# Patient Record
Sex: FEMALE | Race: WHITE | NOT HISPANIC OR LATINO | Employment: FULL TIME | URBAN - METROPOLITAN AREA
[De-identification: names, ages, dates, MRNs, and addresses within clinical notes are randomized per-mention and may not be internally consistent; named-entity substitution may affect disease eponyms.]

---

## 2023-06-01 ENCOUNTER — OFFICE VISIT (OUTPATIENT)
Dept: PRIMARY CARE | Facility: CLINIC | Age: 33
End: 2023-06-01
Payer: COMMERCIAL

## 2023-06-01 VITALS
SYSTOLIC BLOOD PRESSURE: 114 MMHG | HEIGHT: 62 IN | WEIGHT: 111 LBS | HEART RATE: 75 BPM | OXYGEN SATURATION: 97 % | BODY MASS INDEX: 20.43 KG/M2 | DIASTOLIC BLOOD PRESSURE: 80 MMHG

## 2023-06-01 DIAGNOSIS — F98.8 ATTENTION DEFICIT DISORDER (ADD) WITHOUT HYPERACTIVITY: Primary | ICD-10-CM

## 2023-06-01 PROBLEM — R87.610 ASCUS WITH POSITIVE HIGH RISK HPV CERVICAL: Status: ACTIVE | Noted: 2023-06-01

## 2023-06-01 PROBLEM — F40.243 FEAR OF FLYING: Status: ACTIVE | Noted: 2023-06-01

## 2023-06-01 PROBLEM — J01.00 ACUTE MAXILLARY SINUSITIS: Status: ACTIVE | Noted: 2023-06-01

## 2023-06-01 PROBLEM — R87.810 ASCUS WITH POSITIVE HIGH RISK HPV CERVICAL: Status: ACTIVE | Noted: 2023-06-01

## 2023-06-01 PROBLEM — T75.3XXA MOTION SICKNESS: Status: ACTIVE | Noted: 2023-06-01

## 2023-06-01 PROBLEM — N87.0 CIN I (CERVICAL INTRAEPITHELIAL NEOPLASIA I): Status: ACTIVE | Noted: 2023-06-01

## 2023-06-01 PROBLEM — M25.561 KNEE PAIN, RIGHT: Status: ACTIVE | Noted: 2023-06-01

## 2023-06-01 PROBLEM — F32.0 DEPRESSION, MAJOR, SINGLE EPISODE, MILD (CMS-HCC): Status: ACTIVE | Noted: 2023-06-01

## 2023-06-01 PROBLEM — M54.2 CERVICAL PAIN: Status: ACTIVE | Noted: 2023-06-01

## 2023-06-01 PROBLEM — N87.1 CIN II (CERVICAL INTRAEPITHELIAL NEOPLASIA II): Status: ACTIVE | Noted: 2023-06-01

## 2023-06-01 PROBLEM — F41.9 ANXIETY: Status: ACTIVE | Noted: 2023-06-01

## 2023-06-01 PROBLEM — L70.9 ACNE: Status: ACTIVE | Noted: 2023-06-01

## 2023-06-01 PROBLEM — D06.9 CIN III (CERVICAL INTRAEPITHELIAL NEOPLASIA III): Status: ACTIVE | Noted: 2023-06-01

## 2023-06-01 PROCEDURE — 99213 OFFICE O/P EST LOW 20 MIN: CPT | Performed by: FAMILY MEDICINE

## 2023-06-01 PROCEDURE — 1036F TOBACCO NON-USER: CPT | Performed by: FAMILY MEDICINE

## 2023-06-01 RX ORDER — BUPROPION HYDROCHLORIDE 300 MG/1
300 TABLET ORAL DAILY
COMMUNITY
End: 2023-09-28

## 2023-06-01 RX ORDER — ACETAMINOPHEN 500 MG
TABLET ORAL 2 TIMES DAILY
COMMUNITY
Start: 2013-12-19

## 2023-06-01 RX ORDER — ATOMOXETINE 40 MG/1
40 CAPSULE ORAL DAILY
Qty: 30 CAPSULE | Refills: 1 | Status: SHIPPED | OUTPATIENT
Start: 2023-06-01 | End: 2023-08-14 | Stop reason: SDUPTHER

## 2023-06-01 RX ORDER — LEVONORGESTREL 52 MG/1
INTRAUTERINE DEVICE INTRAUTERINE
COMMUNITY

## 2023-06-01 NOTE — PROGRESS NOTES
"Subjective   Patient ID: Yumi Richard is a 33 y.o. female who presents for Discuss ADHD.  States she was on Adderall XR while in college; would like to try Mydias which seems to be similar to the Adderall. 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 Feels her diagnosis is making it hard for her at work. Having a hard time with concentration and executive decision making.   has a diagnosis of ADD.  Was on adderrall in high school and college,  did well on extended release in college   No problems with side effects,   Did have some appetite suppression     Working nights is going to swing shift   Trouble focusing on tasks at work     Is still on the wellbutrin,  mood is ok  ,  but not focusing as much   No Strattera in past  Some anxiety             Review of Systems    Objective   /80   Pulse 75   Ht 1.575 m (5' 2\")   Wt 50.3 kg (111 lb)   SpO2 97%   BMI 20.30 kg/m²     Physical Exam  Constitutional:       Appearance: Normal appearance. She is well-developed.   Cardiovascular:      Rate and Rhythm: Normal rate and regular rhythm.      Heart sounds: Normal heart sounds. No murmur heard.  Pulmonary:      Effort: Pulmonary effort is normal.      Breath sounds: Normal breath sounds.   Neurological:      General: No focal deficit present.      Mental Status: She is alert.         Assessment/Plan   Problem List Items Addressed This Visit       ADD (attention deficit disorder) - Primary    Relevant Medications    atomoxetine (Strattera) 40 mg capsule          "

## 2023-06-01 NOTE — PATIENT INSTRUCTIONS
Attention deficit disorder: ADD is a condition that causes a person to have difficulty with focusing.  Behaviorally, you can do things such as keeping a regular schedule, trying to stay on task by writing down your plan and schedule.  Minimizing distractions when you are trying to accomplish a task such as studying or working is helpful with ADD.  Also, when you're trying to focus taking regular scheduled breaks is helpful.  If you are on medications for ADD be aware of side effects such as diminished appetite, or trouble sleeping.  If you have problems with your medications please let your doctor know.  For ADD: It is important that you get regular exercise.  It is important to get regular sleep, let your doctor know if you have worsening sleep issues.  Also, eat regular meals and notify us of any significant changes in your appetite.  .  If you are on medication for ADD you should be seen in the office every 3-4 months for recheck on your medications.     Trial of strattera first     If not helping we can try the stimulant     Follow-up in 4 to 6 weeks for reassessment

## 2023-08-14 ENCOUNTER — TELEPHONE (OUTPATIENT)
Dept: PRIMARY CARE | Facility: CLINIC | Age: 33
End: 2023-08-14
Payer: COMMERCIAL

## 2023-08-14 DIAGNOSIS — F98.8 ATTENTION DEFICIT DISORDER (ADD) WITHOUT HYPERACTIVITY: ICD-10-CM

## 2023-08-14 RX ORDER — ATOMOXETINE 60 MG/1
60 CAPSULE ORAL DAILY
Qty: 30 CAPSULE | Refills: 1 | Status: SHIPPED | OUTPATIENT
Start: 2023-08-14 | End: 2023-10-17

## 2023-08-14 NOTE — TELEPHONE ENCOUNTER
Mary Ellen Hale MD  You4 hours ago (12:57 PM)       Increase to 60 mg, new prescription sent in, give it a month or so we can increase it again if needed   LVM. CA

## 2023-08-14 NOTE — TELEPHONE ENCOUNTER
Rx Refill Request Telephone Encounter    Name:  Yumi Richard  :  [id number]  Medication Name:    Atomoxetine 40 mg 1 tab every day - 90 day    PT is requesting a call, would like to increase the dose - last talked with  on 23    Specific Pharmacy location:  [facility]  Date of last appointment:  2023  Date of next appointment:  na  Best number to reach patient:  [phone]

## 2023-10-17 DIAGNOSIS — F98.8 ATTENTION DEFICIT DISORDER (ADD) WITHOUT HYPERACTIVITY: ICD-10-CM

## 2023-10-17 RX ORDER — ATOMOXETINE 60 MG/1
CAPSULE ORAL
Qty: 30 CAPSULE | Refills: 0 | Status: SHIPPED | OUTPATIENT
Start: 2023-10-17 | End: 2024-01-02

## 2023-11-22 ENCOUNTER — LAB REQUISITION (OUTPATIENT)
Dept: LAB | Facility: LAB | Age: 33
End: 2023-11-22
Payer: COMMERCIAL

## 2023-11-22 PROCEDURE — 87635 SARS-COV-2 COVID-19 AMP PRB: CPT

## 2023-11-23 LAB — SARS-COV-2 RNA RESP QL NAA+PROBE: DETECTED

## 2023-12-29 DIAGNOSIS — F98.8 ATTENTION DEFICIT DISORDER (ADD) WITHOUT HYPERACTIVITY: ICD-10-CM

## 2024-01-02 RX ORDER — ATOMOXETINE 60 MG/1
CAPSULE ORAL
Qty: 30 CAPSULE | Refills: 0 | Status: SHIPPED | OUTPATIENT
Start: 2024-01-02 | End: 2024-02-08

## 2024-01-03 ENCOUNTER — TELEPHONE (OUTPATIENT)
Dept: PRIMARY CARE | Facility: CLINIC | Age: 34
End: 2024-01-03
Payer: COMMERCIAL

## 2024-01-04 NOTE — TELEPHONE ENCOUNTER
Enriqueta Ramirez18 hours ago (4:20 PM)     CN  Pt called in for a refill on Strattera 60 mg to [facility]. Please advise             Note         Yumi Richard [phone]  Enriqueta Ramirez   Spoke with [facility] the RX was already sent on 1/2/24. LVM w/ pt. CA

## 2024-02-08 DIAGNOSIS — F98.8 ATTENTION DEFICIT DISORDER (ADD) WITHOUT HYPERACTIVITY: ICD-10-CM

## 2024-02-08 RX ORDER — ATOMOXETINE 60 MG/1
CAPSULE ORAL
Qty: 30 CAPSULE | Refills: 0 | Status: SHIPPED | OUTPATIENT
Start: 2024-02-08 | End: 2024-03-27 | Stop reason: SDUPTHER

## 2024-03-15 DIAGNOSIS — T75.3XXD MOTION SICKNESS, SUBSEQUENT ENCOUNTER: ICD-10-CM

## 2024-03-15 RX ORDER — ONDANSETRON 4 MG/1
4 TABLET, FILM COATED ORAL EVERY 8 HOURS PRN
COMMUNITY
End: 2024-03-15 | Stop reason: SDUPTHER

## 2024-03-15 RX ORDER — ONDANSETRON 4 MG/1
4 TABLET, FILM COATED ORAL EVERY 8 HOURS PRN
Qty: 20 TABLET | Refills: 0 | Status: SHIPPED | OUTPATIENT
Start: 2024-03-15

## 2024-03-15 NOTE — TELEPHONE ENCOUNTER
Pt called in stating it has been over year since she has seen her physical therapist so she in in need of a new referral. Please advise.    Pt also in need of a refill on her zofran for her upcoming cruise for sea sickness  Pharmacy: [facility] [address] [facility]   Medication(s): zofran   Dose: 4 mg  Qty:   Last Office Visit: 06/01/2023  Next Office Visit: 03/27/2024

## 2024-03-27 ENCOUNTER — OFFICE VISIT (OUTPATIENT)
Dept: PRIMARY CARE | Facility: CLINIC | Age: 34
End: 2024-03-27
Payer: COMMERCIAL

## 2024-03-27 VITALS
DIASTOLIC BLOOD PRESSURE: 75 MMHG | BODY MASS INDEX: 20.5 KG/M2 | WEIGHT: 111.38 LBS | HEART RATE: 75 BPM | OXYGEN SATURATION: 98 % | SYSTOLIC BLOOD PRESSURE: 110 MMHG | HEIGHT: 62 IN

## 2024-03-27 DIAGNOSIS — F32.0 DEPRESSION, MAJOR, SINGLE EPISODE, MILD (CMS-HCC): Primary | ICD-10-CM

## 2024-03-27 DIAGNOSIS — F98.8 ATTENTION DEFICIT DISORDER (ADD) WITHOUT HYPERACTIVITY: ICD-10-CM

## 2024-03-27 DIAGNOSIS — Z13.220 LIPID SCREENING: ICD-10-CM

## 2024-03-27 DIAGNOSIS — Z23 NEED FOR TDAP VACCINATION: ICD-10-CM

## 2024-03-27 DIAGNOSIS — R01.1 MURMUR, CARDIAC: ICD-10-CM

## 2024-03-27 PROCEDURE — 93000 ELECTROCARDIOGRAM COMPLETE: CPT | Performed by: FAMILY MEDICINE

## 2024-03-27 PROCEDURE — 90471 IMMUNIZATION ADMIN: CPT | Performed by: FAMILY MEDICINE

## 2024-03-27 PROCEDURE — 1036F TOBACCO NON-USER: CPT | Performed by: FAMILY MEDICINE

## 2024-03-27 PROCEDURE — 90715 TDAP VACCINE 7 YRS/> IM: CPT | Performed by: FAMILY MEDICINE

## 2024-03-27 PROCEDURE — 99214 OFFICE O/P EST MOD 30 MIN: CPT | Performed by: FAMILY MEDICINE

## 2024-03-27 RX ORDER — ATOMOXETINE 60 MG/1
CAPSULE ORAL
Qty: 90 CAPSULE | Refills: 3 | Status: SHIPPED | OUTPATIENT
Start: 2024-03-27

## 2024-03-27 NOTE — PATIENT INSTRUCTIONS
Attention deficit disorder: ADD is a condition that causes a person to have difficulty with focusing.  Behaviorally, you can do things such as keeping a regular schedule, trying to stay on task by writing down your plan and schedule.  Minimizing distractions when you are trying to accomplish a task such as studying or working is helpful with ADD.  Also, when you're trying to focus taking regular scheduled breaks is helpful.  If you are on medications for ADD be aware of side effects such as diminished appetite, or trouble sleeping.  If you have problems with your medications please let your doctor know.  For ADD: It is important that you get regular exercise.  It is important to get regular sleep, let your doctor know if you have worsening sleep issues.  Also, eat regular meals and notify us of any significant changes in your appetite.  .  If you are on medication for ADD you should be seen in the office every 3-4 months for recheck on your medications.      EKG looks okay  Discussed PFO: Not genetic,very slight murmur will check echo

## 2024-03-27 NOTE — PROGRESS NOTES
"Subjective   Patient ID: Yumi Richard is a 33 y.o. female who presents for Follow-up. med f/u pt asking for 12 lead EKG due to father's recent stroke cause by PFO.        Patient has ADD and depression: Here for follow-up  On bupropion and Strattera  hs a good positive outlook       Does have a dry mouth,     Drinking more water       Father with PFO     History of abnormal Pap, seeing GYN, recent was normal         Review of Systems    Objective   /75   Pulse 75   Ht 1.575 m (5' 2\")   Wt 50.5 kg (111 lb 6 oz)   SpO2 98%   BMI 20.37 kg/m²     Physical Exam  Constitutional:       Appearance: Normal appearance. She is well-developed.   Cardiovascular:      Rate and Rhythm: Normal rate and regular rhythm.      Heart sounds: Murmur heard.      Comments: Very slight 2 out of 6 systolic ejection murmur  Pulmonary:      Effort: Pulmonary effort is normal.      Breath sounds: Normal breath sounds.   Neurological:      General: No focal deficit present.      Mental Status: She is alert.   Psychiatric:         Mood and Affect: Mood normal.         Assessment/Plan   Problem List Items Addressed This Visit             ICD-10-CM    ADD (attention deficit disorder) F98.8    Relevant Medications    atomoxetine (Strattera) 60 mg capsule    Other Relevant Orders    ECG 12 lead (Clinic Performed) (Completed)    CBC    Comprehensive Metabolic Panel    Thyroid Stimulating Hormone    Lipid Panel    Depression, major, single episode, mild (CMS/HCC) - Primary F32.0    Relevant Orders    ECG 12 lead (Clinic Performed) (Completed)    CBC    Comprehensive Metabolic Panel    Thyroid Stimulating Hormone    Lipid Panel     Other Visit Diagnoses         Codes    Lipid screening     Z13.220    Relevant Orders    Lipid Panel    Murmur, cardiac     R01.1    Relevant Orders    Transthoracic Echo Complete    Need for Tdap vaccination     Z23    Relevant Orders    Tdap vaccine, age 7 years and older  (BOOSTRIX) (Completed)             "

## 2024-04-01 ENCOUNTER — EVALUATION (OUTPATIENT)
Dept: PHYSICAL THERAPY | Facility: CLINIC | Age: 34
End: 2024-04-01
Payer: COMMERCIAL

## 2024-04-01 DIAGNOSIS — M22.2X1 RIGHT PATELLOFEMORAL SYNDROME: Primary | ICD-10-CM

## 2024-04-01 PROCEDURE — 97140 MANUAL THERAPY 1/> REGIONS: CPT | Mod: GP | Performed by: PHYSICAL THERAPIST

## 2024-04-01 PROCEDURE — 97161 PT EVAL LOW COMPLEX 20 MIN: CPT | Mod: GP | Performed by: PHYSICAL THERAPIST

## 2024-04-01 PROCEDURE — 97110 THERAPEUTIC EXERCISES: CPT | Mod: GP | Performed by: PHYSICAL THERAPIST

## 2024-04-01 SDOH — ECONOMIC STABILITY: FOOD INSECURITY: WITHIN THE PAST 12 MONTHS, YOU WORRIED THAT YOUR FOOD WOULD RUN OUT BEFORE YOU GOT MONEY TO BUY MORE.: NEVER TRUE

## 2024-04-01 SDOH — ECONOMIC STABILITY: FOOD INSECURITY: WITHIN THE PAST 12 MONTHS, THE FOOD YOU BOUGHT JUST DIDN'T LAST AND YOU DIDN'T HAVE MONEY TO GET MORE.: NEVER TRUE

## 2024-04-01 ASSESSMENT — ENCOUNTER SYMPTOMS
DEPRESSION: 0
OCCASIONAL FEELINGS OF UNSTEADINESS: 0
LOSS OF SENSATION IN FEET: 0

## 2024-04-01 ASSESSMENT — PAIN - FUNCTIONAL ASSESSMENT: PAIN_FUNCTIONAL_ASSESSMENT: 0-10

## 2024-04-01 ASSESSMENT — ACTIVITIES OF DAILY LIVING (ADL): ADL_ASSISTANCE: INDEPENDENT

## 2024-04-01 ASSESSMENT — PATIENT HEALTH QUESTIONNAIRE - PHQ9
2. FEELING DOWN, DEPRESSED OR HOPELESS: NOT AT ALL
1. LITTLE INTEREST OR PLEASURE IN DOING THINGS: NOT AT ALL
SUM OF ALL RESPONSES TO PHQ9 QUESTIONS 1 AND 2: 0

## 2024-04-01 ASSESSMENT — COLUMBIA-SUICIDE SEVERITY RATING SCALE - C-SSRS
1. IN THE PAST MONTH, HAVE YOU WISHED YOU WERE DEAD OR WISHED YOU COULD GO TO SLEEP AND NOT WAKE UP?: NO
6. HAVE YOU EVER DONE ANYTHING, STARTED TO DO ANYTHING, OR PREPARED TO DO ANYTHING TO END YOUR LIFE?: NO
2. HAVE YOU ACTUALLY HAD ANY THOUGHTS OF KILLING YOURSELF?: NO

## 2024-04-01 ASSESSMENT — PAIN SCALES - GENERAL: PAINLEVEL_OUTOF10: 3

## 2024-04-01 ASSESSMENT — PAIN DESCRIPTION - DESCRIPTORS: DESCRIPTORS: ACHING;DULL

## 2024-04-02 NOTE — PROGRESS NOTES
Physical Therapy  Physical Therapy Orthopedic Evaluation    Patient Name: Yumi Richard  MRN: [id number]  Today's Date: 2024  Time Calculation  Start Time: 1315  Stop Time: 1400  Time Calculation (min): 45 min  PT Evaluation Time Entry  PT Evaluation (Low) Time Entry: 20, PT Therapeutic Procedures Time Entry  Manual Therapy Time Entry: 15  Therapeutic Exercise Time Entry: 10,      Insurance:  Payor:  EMPLOYEE MEDICAL PLAN / Plan:  EMPLOYEE MEDICAL PLAN TRADITIONAL  / Product Type: *No Product type* /   Number of Treatments Authorized:           Current Problem  1. Right patellofemoral syndrome  Follow Up In Physical Therapy    Follow Up In Physical Therapy          Precautions:   Precautions  STEADI Fall Risk Score (The score of 4 or more indicates an increased risk of falling): 0  Precautions Comment: None    Medical History Form: Reviewed (scanned into chart)    Subjective:   Subjective   General:  General  Reason for Referral: R knee pain  Referred By: Mary Ellen Hale MD  General Comment: Patient states that over the past couple months, she noticed more pain along the front of her knee. Pain started after working a  and noticing discomfort along the front of her knee. Notes that while doing yoga or turning she is afraid to injure herself. No feelings of instability or that the leg will give way.    Pain:  Pain Assessment: 0-10  Pain Score: 3 (6/10 with prolonged standing, stairs (mostly up) and pivoting, Reduced pain with sitting and laying)  Pain Type: Acute pain  Pain Location: Knee  Pain Orientation: Right, Inner, Lower  Pain Descriptors: Aching, Dull (With increased strain feels like it may dislocte.)  Pain Frequency: Constant/continuous  Pain Onset: Ongoing  Clinical Progression: Gradually worsening  Effect of Pain on Daily Activities: Difficulty with stairs  Patient's Stated Pain Goal: No pain  Pain Interventions: Medication (See MAR)  Response to Interventions: Slight  improvement    Relevant Information (PMH & Previous Tests/Imaging): NOne  Previous Interventions/Treatments: Physical Therapy    Prior Level of Function (PLOF)  Prior Function Per Pt/Caregiver Report  Level of [place]: Independent with ADLs and functional transfers  ADL Assistance: Independent  Homemaking Assistance: Independent  Ambulatory Assistance: Independent  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 No  Fever/chills, unexplained weight changes, dizziness/fainting, unexplained change in bowel or bladder functions, unexplained malaise or muscle weakness, night pain/sweats, numbness or tingling    Objective   HIP    Specific Lower Extremity MMT   R Iliopsoas: (5/5): 4+/5  L Iliopsoas: (5/5): 4+/5  R Gluteals (prone): (5/5): 4/5  L Gluteals (prone): (5/5): 4+/5  R Gluteals (sidelying): (5/5): 4/5  L Gluteals (sidelying): (5/5): 4/5    KNEE    Knee AROM  R knee flexion: (140°): 145  L knee flexion: (140°): 145  R knee extension: (0°): 4 HE  L knee extension: (0°): 5 HE  Knee PROM  R knee flexion: (140°): 145  L knee flexion: (140°): 145  R knee extension: (0°): 5 HE  L knee extension: (0°): 5 HE  Knee MMT  R knee flexion: (5/5): 7.9 kg  L knee flexion: (5/5): 11.1 kg  R knee extension: (5/5): 15 kg  L knee extension: (5/5): 16.4 kg  Special Tests  Lachman’s: (Negative): Negative  Anterior Drawer: (Negative): Negative  Varus at 0°: (Negative): Negative  Valgus at 0°: (Negative): Negative  Christine’s: (Negative): Negative  Other: Positive patellar grind    Gait  Gait Comment: No gait deviation    Flexibility  R hamstrings: Min tightness  L hamstrings: Min Tightness    Outcome Measures:    Other Measures  Lower Extremity  Funtional Score (LEFS): 62/80    EDUCATION: home exercise program, plan of care, activity modifications, pain management, and injury pathology  Outpatient Education  Individual(s) Educated: Patient  Education Provided: Anatomy, Home Exercise Program, POC  Risk and Benefits Discussed with Patient/Caregiver/Other: yes  Patient/Caregiver Demonstrated Understanding: yes  Plan of Care Discussed and Agreed Upon: yes  Patient Response to Education: Patient/Caregiver Verbalized Understanding of Information, Patient/Caregiver Performed Return Demonstration of Exercises/Activities, Patient/Caregiver Asked Appropriate Questions  Education Comment: Access Code: [email or web address]  URL: [email or web address]  Date: 04/01/2024  Prepared by: Jose Saleem    Exercises  - Supine Active Straight Leg Raise  - 1 x daily - 7 x weekly - 3 sets - 10 reps  - Standing Isometric Hip Abduction with Ball on Wall  - 1 x daily - 7 x weekly - 3 sets - 10 reps - 5 sec hold  - Wall Quarter Squat  - 1 x daily - 7 x weekly - 3 sets - 5 reps - 10 sec hold    Assessment:  PT Assessment Results: Decreased strength, Decreased range of motion, Impaired balance, Decreased mobility, Pain  Assessment Comment: Patient is a 34-year-old female presents to physical therapy and signs symptoms consistent with right knee pain.  Patient presents with increased pain, reduced lower extremity strength as well as reduced lower extremity stability.  These are preventing her from completing ADLs without pain or difficulty as well as work without pain or difficulty.  Therefore she will require skilled physical therapy in order to address stated deficits for full return to pain-free function.    Clinical Presentation: Stable and/or uncomplicated characteristics  Personal Factors: None    Plan:  Treatment/Interventions: Dry needling, Education/ Instruction, Electrical stimulation, Manual therapy, Neuromuscular re-education, Self care/ home management,  Therapeutic exercises, Taping techniques, Therapeutic activities  PT Plan: Skilled PT  PT Frequency: 2 times per week  Duration: 10 weeks  Onset Date: 02/01/24  Number of Treatments Authorized: 1/30  Rehab Potential: Good  Plan of Care Agreement: Patient    Goals: Set and discussed today  Active       PT Problem       PT Goal 1       Start:  04/01/24    Expected End:  06/24/24       STG  1) Patient will be able to complete all normal activities with pain no greater than 1 /10 in 6 weeks.  2) Patient will be able to perform proper squatting technique in 6 weeks in order to reduce compression on knee and prevent increased pain with daily tasks.   3) Patient will be independent with HEP in 3 visits to allow for continued improvement in daily tasks at home and in the community.    LTG  1) Patient will improve LEFS to 71/80 in order to allow for greater completion of functional activities at home and in the community in 10 weeks.  2) Patient will have 5-/5 strength in lateral hip stabilizers to prevent any descending compensations required for proper gait mechanics in 10 weeks.  3) Patient will be able to perform >30 seconds of right SLS on multiple surfaces in order to allow for safe ambulation on all levels within the community in 6 weeks.            Patient Stated Goal 1       Start:  04/01/24    Expected End:  06/10/24       Strengthen R leg and lessen the pain             Plan of care was developed with input and agreement by the patient    Treatments:  Therapeutic Exercise  Therapeutic Exercise Performed: Yes  Therapeutic Exercise Activity 1: See HEP    Manual Therapy  Manual Therapy Performed: Yes  Manual Therapy Activity 1: Grade 3 superior glide patella R  Manual Therapy Activity 2: STM: distal quadriceps, as well as HS and gastroc in prone      Jose Saleem, PT

## 2024-04-08 ENCOUNTER — TREATMENT (OUTPATIENT)
Dept: PHYSICAL THERAPY | Facility: CLINIC | Age: 34
End: 2024-04-08
Payer: COMMERCIAL

## 2024-04-08 DIAGNOSIS — M22.2X1 RIGHT PATELLOFEMORAL SYNDROME: ICD-10-CM

## 2024-04-08 PROCEDURE — 97110 THERAPEUTIC EXERCISES: CPT | Mod: GP | Performed by: PHYSICAL THERAPIST

## 2024-04-08 PROCEDURE — 97140 MANUAL THERAPY 1/> REGIONS: CPT | Mod: GP | Performed by: PHYSICAL THERAPIST

## 2024-04-08 ASSESSMENT — PAIN - FUNCTIONAL ASSESSMENT: PAIN_FUNCTIONAL_ASSESSMENT: 0-10

## 2024-04-08 ASSESSMENT — PAIN DESCRIPTION - DESCRIPTORS: DESCRIPTORS: ACHING;DULL

## 2024-04-08 ASSESSMENT — PAIN SCALES - GENERAL: PAINLEVEL_OUTOF10: 2

## 2024-04-08 NOTE — PROGRESS NOTES
"  Physical Therapy Treatment    Patient Name: Yumi Richard  MRN: [id number]  Today's Date: 4/8/2024  Time Calculation  Start Time: 1300  Stop Time: 1350  Time Calculation (min): 50 min  PT Therapeutic Procedures Time Entry  Manual Therapy Time Entry: 10  Therapeutic Exercise Time Entry: 38,      Current Problem  1. Right patellofemoral syndrome  Follow Up In Physical Therapy            Insurance:  Payor:  EMPLOYEE MEDICAL PLAN / Plan:  EMPLOYEE MEDICAL PLAN TRADITIONAL  / Product Type: *No Product type* /   Number of Treatments Authorized: 2/30          Subjective   General  Reason for Referral: R knee pain  Referred By: Mary Ellen Hale MD  General Comment: Patient states that her trip went well with a lot of walking. Notes she did a lot of stretching which went well though had some shin discomfort.    Performing HEP?: Yes    Precautions  Precautions  STEADI Fall Risk Score (The score of 4 or more indicates an increased risk of falling): 0  Precautions Comment: None  Pain  Pain Assessment: 0-10  Pain Score: 2  Pain Type: Acute pain  Pain Location: Knee  Pain Descriptors: Aching, Dull    Objective   TTP patellar tendon    Treatments:    Therapeutic Exercise  Therapeutic Exercise Activity 1: Sportsarc lvl 3 x 6 min  Therapeutic Exercise Activity 2: Dynamics: high knee, quad pull, open/close, side lunge x 2  Therapeutic Exercise Activity 3: Total gym lvl 5 SLS with 5\" hold 3 x 8  Therapeutic Exercise Activity 4: Toe raise on wall 3\" pause 2 x 30  Therapeutic Exercise Activity 5: Matrix retro walking 15# x 15  Therapeutic Exercise Activity 6: Leg press 2 x 8 ea LE 40#         Manual Therapy  Manual Therapy Activity 1: Grade 3 superior glide patella R  Manual Therapy Activity 2: STM: distal quadriceps, as well as HS and gastroc in prone      Assessment:  PT Assessment  PT Assessment Results: Decreased strength, Decreased range of motion, Impaired balance, Decreased mobility, Pain  Assessment Comment: PT continues " to progress lower extremity strengthening isometric holds.  Overall focused on quadricep stability and patellar loading.  Minor difficulty with single-leg leg press on the right compared to the left though no increase in pain.  Remains tender with strumming over patellar tendon though no increase in pain following the session.    Plan:  OP PT Plan  Treatment/Interventions: Dry needling, Education/ Instruction, Electrical stimulation, Manual therapy, Neuromuscular re-education, Self care/ home management, Therapeutic exercises, Taping techniques, Therapeutic activities  PT Plan: Skilled PT  PT Frequency: 2 times per week  Duration: 10 weeks  Onset Date: 02/01/24  Number of Treatments Authorized: 2/30  Rehab Potential: Good  Plan of Care Agreement: Patient    Goals:  Active       PT Problem       PT Goal 1       Start:  04/01/24    Expected End:  06/24/24       STG  1) Patient will be able to complete all normal activities with pain no greater than 1 /10 in 6 weeks.  2) Patient will be able to perform proper squatting technique in 6 weeks in order to reduce compression on knee and prevent increased pain with daily tasks.   3) Patient will be independent with HEP in 3 visits to allow for continued improvement in daily tasks at home and in the community.    LTG  1) Patient will improve LEFS to 71/80 in order to allow for greater completion of functional activities at home and in the community in 10 weeks.  2) Patient will have 5-/5 strength in lateral hip stabilizers to prevent any descending compensations required for proper gait mechanics in 10 weeks.  3) Patient will be able to perform >30 seconds of right SLS on multiple surfaces in order to allow for safe ambulation on all levels within the community in 6 weeks.            Patient Stated Goal 1       Start:  04/01/24    Expected End:  06/10/24       Strengthen R leg and lessen the pain              Jose Saleem, PT

## 2024-04-12 ENCOUNTER — HOSPITAL ENCOUNTER (OUTPATIENT)
Dept: CARDIOLOGY | Facility: HOSPITAL | Age: 34
Discharge: HOME | End: 2024-04-12
Payer: COMMERCIAL

## 2024-04-12 ENCOUNTER — TREATMENT (OUTPATIENT)
Dept: PHYSICAL THERAPY | Facility: CLINIC | Age: 34
End: 2024-04-12
Payer: COMMERCIAL

## 2024-04-12 ENCOUNTER — LAB (OUTPATIENT)
Dept: LAB | Facility: LAB | Age: 34
End: 2024-04-12
Payer: COMMERCIAL

## 2024-04-12 VITALS
SYSTOLIC BLOOD PRESSURE: 110 MMHG | BODY MASS INDEX: 20.43 KG/M2 | HEIGHT: 62 IN | DIASTOLIC BLOOD PRESSURE: 75 MMHG | WEIGHT: 111 LBS

## 2024-04-12 DIAGNOSIS — F98.8 ATTENTION DEFICIT DISORDER (ADD) WITHOUT HYPERACTIVITY: ICD-10-CM

## 2024-04-12 DIAGNOSIS — F32.0 DEPRESSION, MAJOR, SINGLE EPISODE, MILD (CMS-HCC): ICD-10-CM

## 2024-04-12 DIAGNOSIS — M22.2X1 RIGHT PATELLOFEMORAL SYNDROME: ICD-10-CM

## 2024-04-12 DIAGNOSIS — R01.1 MURMUR, CARDIAC: ICD-10-CM

## 2024-04-12 DIAGNOSIS — Z13.220 LIPID SCREENING: ICD-10-CM

## 2024-04-12 LAB
ALBUMIN SERPL BCP-MCNC: 4.4 G/DL (ref 3.4–5)
ALP SERPL-CCNC: 58 U/L (ref 33–110)
ALT SERPL W P-5'-P-CCNC: 15 U/L (ref 7–45)
ANION GAP SERPL CALC-SCNC: 10 MMOL/L (ref 10–20)
AORTIC VALVE MEAN GRADIENT: 4 MMHG
AORTIC VALVE PEAK VELOCITY: 1.32 M/S
AST SERPL W P-5'-P-CCNC: 15 U/L (ref 9–39)
AV PEAK GRADIENT: 7 MMHG
AVA (PEAK VEL): 1.97 CM2
AVA (VTI): 1.95 CM2
BILIRUB SERPL-MCNC: 0.4 MG/DL (ref 0–1.2)
BUN SERPL-MCNC: 12 MG/DL (ref 6–23)
CALCIUM SERPL-MCNC: 9.4 MG/DL (ref 8.6–10.3)
CHLORIDE SERPL-SCNC: 105 MMOL/L (ref 98–107)
CHOLEST SERPL-MCNC: 173 MG/DL (ref 0–199)
CHOLESTEROL/HDL RATIO: 2.5
CO2 SERPL-SCNC: 30 MMOL/L (ref 21–32)
CREAT SERPL-MCNC: 0.68 MG/DL (ref 0.5–1.05)
EGFRCR SERPLBLD CKD-EPI 2021: >90 ML/MIN/1.73M*2
ERYTHROCYTE [DISTWIDTH] IN BLOOD BY AUTOMATED COUNT: 12.9 % (ref 11.5–14.5)
GLOBAL LONGITUDINAL STRAIN: 21.9 %
GLUCOSE SERPL-MCNC: 88 MG/DL (ref 74–99)
HCT VFR BLD AUTO: 39.6 % (ref 36–46)
HDLC SERPL-MCNC: 68.3 MG/DL
HGB BLD-MCNC: 13 G/DL (ref 12–16)
LDLC SERPL CALC-MCNC: 95 MG/DL
LEFT ATRIUM VOLUME AREA LENGTH INDEX BSA: 16.7 ML/M2
LEFT VENTRICLE INTERNAL DIMENSION DIASTOLE: 3.9 CM (ref 3.5–6)
LEFT VENTRICULAR OUTFLOW TRACT DIAMETER: 1.8 CM
MCH RBC QN AUTO: 31.3 PG (ref 26–34)
MCHC RBC AUTO-ENTMCNC: 32.8 G/DL (ref 32–36)
MCV RBC AUTO: 95 FL (ref 80–100)
MITRAL VALVE E/A RATIO: 2.36
MITRAL VALVE E/E' RATIO: 6.12
NON HDL CHOLESTEROL: 105 MG/DL (ref 0–149)
NRBC BLD-RTO: 0 /100 WBCS (ref 0–0)
PLATELET # BLD AUTO: 339 X10*3/UL (ref 150–450)
POTASSIUM SERPL-SCNC: 4.9 MMOL/L (ref 3.5–5.3)
PROT SERPL-MCNC: 6.4 G/DL (ref 6.4–8.2)
RBC # BLD AUTO: 4.16 X10*6/UL (ref 4–5.2)
RIGHT VENTRICLE FREE WALL PEAK S': 11.7 CM/S
RIGHT VENTRICLE PEAK SYSTOLIC PRESSURE: 18.4 MMHG
SODIUM SERPL-SCNC: 140 MMOL/L (ref 136–145)
TRICUSPID ANNULAR PLANE SYSTOLIC EXCURSION: 1.8 CM
TRIGL SERPL-MCNC: 50 MG/DL (ref 0–149)
TSH SERPL-ACNC: 1.19 MIU/L (ref 0.44–3.98)
VLDL: 10 MG/DL (ref 0–40)
WBC # BLD AUTO: 8.7 X10*3/UL (ref 4.4–11.3)

## 2024-04-12 PROCEDURE — 80053 COMPREHEN METABOLIC PANEL: CPT

## 2024-04-12 PROCEDURE — 93306 TTE W/DOPPLER COMPLETE: CPT

## 2024-04-12 PROCEDURE — 93306 TTE W/DOPPLER COMPLETE: CPT | Performed by: INTERNAL MEDICINE

## 2024-04-12 PROCEDURE — 97110 THERAPEUTIC EXERCISES: CPT | Mod: GP | Performed by: PHYSICAL THERAPIST

## 2024-04-12 PROCEDURE — 36415 COLL VENOUS BLD VENIPUNCTURE: CPT

## 2024-04-12 PROCEDURE — 84443 ASSAY THYROID STIM HORMONE: CPT

## 2024-04-12 PROCEDURE — 80061 LIPID PANEL: CPT

## 2024-04-12 PROCEDURE — 85027 COMPLETE CBC AUTOMATED: CPT

## 2024-04-12 PROCEDURE — 97140 MANUAL THERAPY 1/> REGIONS: CPT | Mod: GP | Performed by: PHYSICAL THERAPIST

## 2024-04-12 ASSESSMENT — PAIN SCALES - GENERAL: PAINLEVEL_OUTOF10: 4

## 2024-04-12 ASSESSMENT — PAIN - FUNCTIONAL ASSESSMENT: PAIN_FUNCTIONAL_ASSESSMENT: 0-10

## 2024-04-12 NOTE — PROGRESS NOTES
"  Physical Therapy Treatment    Patient Name: Yumi Richard  MRN: [id number]  Today's Date: 4/12/2024  Time Calculation  Start Time: 0730  Stop Time: 0814  Time Calculation (min): 44 min  PT Therapeutic Procedures Time Entry  Manual Therapy Time Entry: 15  Therapeutic Exercise Time Entry: 26,      Current Problem  1. Right patellofemoral syndrome  Follow Up In Physical Therapy            Insurance:  Payor:  EMPLOYEE MEDICAL PLAN / Plan:  EMPLOYEE MEDICAL PLAN TRADITIONAL  / Product Type: *No Product type* /   Number of Treatments Authorized: 3/30          Subjective   General  Reason for Referral: R knee pain  Referred By: Mary Ellen Hale MD  General Comment: Patient states that her knee felt good until yesterday mid shift after 3 in a row. Notes today she is having the medial discomfort and tightness in her whole leg.    Performing HEP?: Yes    Precautions  Precautions  STEADI Fall Risk Score (The score of 4 or more indicates an increased risk of falling): 0  Precautions Comment: None  Pain  Pain Assessment: 0-10  Pain Score: 4  Pain Type: Acute pain  Pain Location: Knee    Objective   TTP medial and superior R patella    Treatments:    Therapeutic Exercise  Therapeutic Exercise Activity 1: Sportsarc lvl 3 x 6 min  Therapeutic Exercise Activity 2: Dynamics: high knee, quad pull, open/close, side lunge x 2  Therapeutic Exercise Activity 3: Hip ER stretch on stool x 10 ea  Therapeutic Exercise Activity 4: Total gym lvl 7 squat isometric 3\" hold 2 x 15  Therapeutic Exercise Activity 5: Leg extension 3 x 10 10# 3\" hold         Manual Therapy  Manual Therapy Activity 1: Grade 3 superior glide patella R  Manual Therapy Activity 2: IASTM R quadriceps and patellar framing      Assessment:  PT Assessment  PT Assessment Results: Decreased strength, Decreased range of motion, Impaired balance, Decreased mobility, Pain  Assessment Comment: Patient continues to focus on LE strength and stability. Increased medial and " superior  tenderness improved with IASTM. Open chain remains more challenging and pain ful compared to closed chain.    Plan:  OP PT Plan  Treatment/Interventions: Dry needling, Education/ Instruction, Electrical stimulation, Manual therapy, Neuromuscular re-education, Self care/ home management, Therapeutic exercises, Taping techniques, Therapeutic activities  PT Plan: Skilled PT  PT Frequency: 2 times per week  Duration: 10 weeks  Onset Date: 02/01/24  Number of Treatments Authorized: 3/30  Rehab Potential: Good  Plan of Care Agreement: Patient    Goals:  Active       PT Problem       PT Goal 1       Start:  04/01/24    Expected End:  06/24/24       STG  1) Patient will be able to complete all normal activities with pain no greater than 1 /10 in 6 weeks.  2) Patient will be able to perform proper squatting technique in 6 weeks in order to reduce compression on knee and prevent increased pain with daily tasks.   3) Patient will be independent with HEP in 3 visits to allow for continued improvement in daily tasks at home and in the community.    LTG  1) Patient will improve LEFS to 71/80 in order to allow for greater completion of functional activities at home and in the community in 10 weeks.  2) Patient will have 5-/5 strength in lateral hip stabilizers to prevent any descending compensations required for proper gait mechanics in 10 weeks.  3) Patient will be able to perform >30 seconds of right SLS on multiple surfaces in order to allow for safe ambulation on all levels within the community in 6 weeks.            Patient Stated Goal 1       Start:  04/01/24    Expected End:  06/10/24       Strengthen R leg and lessen the pain              Jose Saleem, PT

## 2024-04-16 ENCOUNTER — TREATMENT (OUTPATIENT)
Dept: PHYSICAL THERAPY | Facility: CLINIC | Age: 34
End: 2024-04-16
Payer: COMMERCIAL

## 2024-04-16 DIAGNOSIS — M22.2X1 RIGHT PATELLOFEMORAL SYNDROME: Primary | ICD-10-CM

## 2024-04-16 PROCEDURE — 97140 MANUAL THERAPY 1/> REGIONS: CPT | Mod: GP,CQ

## 2024-04-16 PROCEDURE — 97110 THERAPEUTIC EXERCISES: CPT | Mod: GP,CQ

## 2024-04-16 ASSESSMENT — PAIN SCALES - GENERAL: PAINLEVEL_OUTOF10: 5 - MODERATE PAIN

## 2024-04-16 ASSESSMENT — PAIN - FUNCTIONAL ASSESSMENT: PAIN_FUNCTIONAL_ASSESSMENT: 0-10

## 2024-04-16 NOTE — PROGRESS NOTES
"  Physical Therapy Treatment    Patient Name: Yumi Richard  MRN: [id number]  Today's Date: 4/16/2024  Time Calculation  Start Time: 0918  Stop Time: 0959  Time Calculation (min): 41 min   ,      Current Problem  1. Right patellofemoral syndrome  Follow Up In Physical Therapy          Insurance:  Number of Treatments Authorized: 4/30            Subjective   General  Reason for Referral: R knee pain  Referred By: Mary Ellen Hale MD  Past Medical History Relevant to Rehab:  (Reviewed pt past medical history - RLM)  General Comment: Patient notes increased R knee sx this morning.  She's feeling a sharp pinching pain R medial joint line.  Patient ptacriced ballet in High School and had an R ACL tear which was confirmed on an image completed in 1/2022.    Performing HEP?: Yes    Precautions  Precautions  STEADI Fall Risk Score (The score of 4 or more indicates an increased risk of falling): 0  Precautions Comment: None  Pain  Pain Assessment: 0-10  Pain Score: 5 - Moderate pain  Pain Type: Acute pain  Pain Location: Knee    Objective       Treatments:    Therapeutic Exercise  Therapeutic Exercise Activity 1: Sportsarc lvl 3 x 6 min  Therapeutic Exercise Activity 2: incline g/s stretch x 1 min  Therapeutic Exercise Activity 3: dynamic knee hug, ankle grab, ankle swipe, HF  Therapeutic Exercise Activity 4: YTB loop footworm and monster walk x 40' ea dir  Therapeutic Exercise Activity 5: Total gym lvl 7 squat isometric 3\" hold 2 x 15  Therapeutic Exercise Activity 6: Supine R SLR with 1# 2 x 10         Manual Therapy  Manual Therapy Activity 1: R knee SAD, PF mobs  Manual Therapy Activity 2: DSTM quad, ITB, andre-patellar                     OP EDUCATION:  Outpatient Education  Education Comment: Discussed being fitted for propper foot wear - given coupon for Achilles    Assessment:  PT Assessment  Assessment Comment: Patient arrived with increased medial joint line sx today.  Educated patient on effects of proper footwear " on alignment. Felt better after session.    Plan:  OP PT Plan  Treatment/Interventions: Dry needling, Education/ Instruction, Electrical stimulation, Manual therapy, Neuromuscular re-education, Self care/ home management, Therapeutic exercises, Taping techniques, Therapeutic activities  PT Plan: Skilled PT  PT Frequency: 2 times per week  Duration: 10 weeks  Onset Date: 02/01/24  Number of Treatments Authorized: 4/30  Rehab Potential: Good  Plan of Care Agreement: Patient    Goals:  Active       PT Problem       PT Goal 1       Start:  04/01/24    Expected End:  06/24/24       STG  1) Patient will be able to complete all normal activities with pain no greater than 1 /10 in 6 weeks.  2) Patient will be able to perform proper squatting technique in 6 weeks in order to reduce compression on knee and prevent increased pain with daily tasks.   3) Patient will be independent with HEP in 3 visits to allow for continued improvement in daily tasks at home and in the community.    LTG  1) Patient will improve LEFS to 71/80 in order to allow for greater completion of functional activities at home and in the community in 10 weeks.  2) Patient will have 5-/5 strength in lateral hip stabilizers to prevent any descending compensations required for proper gait mechanics in 10 weeks.  3) Patient will be able to perform >30 seconds of right SLS on multiple surfaces in order to allow for safe ambulation on all levels within the community in 6 weeks.            Patient Stated Goal 1       Start:  04/01/24    Expected End:  06/10/24       Strengthen R leg and lessen the pain              Carmella Parker, PTA

## 2024-04-18 ENCOUNTER — TREATMENT (OUTPATIENT)
Dept: PHYSICAL THERAPY | Facility: CLINIC | Age: 34
End: 2024-04-18
Payer: COMMERCIAL

## 2024-04-18 DIAGNOSIS — M22.2X1 RIGHT PATELLOFEMORAL SYNDROME: ICD-10-CM

## 2024-04-18 PROCEDURE — 97140 MANUAL THERAPY 1/> REGIONS: CPT | Mod: GP | Performed by: PHYSICAL THERAPIST

## 2024-04-18 PROCEDURE — 97110 THERAPEUTIC EXERCISES: CPT | Mod: GP | Performed by: PHYSICAL THERAPIST

## 2024-04-18 ASSESSMENT — PAIN SCALES - GENERAL: PAINLEVEL_OUTOF10: 3

## 2024-04-18 ASSESSMENT — PAIN - FUNCTIONAL ASSESSMENT: PAIN_FUNCTIONAL_ASSESSMENT: 0-10

## 2024-04-18 NOTE — PROGRESS NOTES
"  Physical Therapy Treatment    Patient Name: Yumi Richard  MRN: [id number]  Today's Date: 4/18/2024  Time Calculation  Start Time: 1245  Stop Time: 1331  Time Calculation (min): 46 min  PT Therapeutic Procedures Time Entry  Manual Therapy Time Entry: 15  Therapeutic Exercise Time Entry: 24  Therapeutic Activity Time Entry: 4,      Current Problem  1. Right patellofemoral syndrome  Follow Up In Physical Therapy            Insurance:  Payor:  EMPLOYEE MEDICAL PLAN / Plan:  EMPLOYEE MEDICAL PLAN TRADITIONAL  / Product Type: *No Product type* /   Number of Treatments Authorized: 5/30          Subjective   General  Reason for Referral: R knee pain  Referred By: Mary Ellen Hale MD  Past Medical History Relevant to Rehab:  (Reviewed pt past medical history - RLM)  General Comment: Patient states that she has gotten new shoes. Notes that her knee feels better to walk than it did.    Performing HEP?: Yes    Precautions  Precautions  STEADI Fall Risk Score (The score of 4 or more indicates an increased risk of falling): 0  Precautions Comment: None  Pain  Pain Assessment: 0-10  Pain Score: 3  Pain Type: Acute pain  Pain Location: Knee    Objective   TTP medial patella    Treatments:    Therapeutic Exercise  Therapeutic Exercise Activity 1: Sportsarc lvl 3 x 6 min  Therapeutic Exercise Activity 2: incline g/s stretch x 1 min  Therapeutic Exercise Activity 3: dynamic: knee hug x 2, quad pull x 2, open/close gait, ankle swipe, Toe walk  Therapeutic Exercise Activity 4: Leg press 4 x 6 ea LE 40#  Therapeutic Exercise Activity 5: KB DL to 6\" step 35# 3 x 6         Manual Therapy  Manual Therapy Activity 1: R PF mobs  Manual Therapy Activity 2: DSTM quad, ITB, andre-patellar    Therapeutic Activity  Therapeutic Activity Performed: Yes  Therapeutic Activity 1: 6\" anterior to posterior tap downs 3 x 5      Assessment:  PT Assessment  PT Assessment Results: Decreased strength, Decreased range of motion, Impaired balance, " Decreased mobility, Pain  Assessment Comment: Patient with minimal increase in pain with close chain exercise this session.  Able to control valgus collapse on step downs as well as leg press.  Minor increase of soreness with manual though resolved following.  Cues to improve latissimus involvement with dead lift for reduced thoracic kyphosis.    Plan:  OP PT Plan  Treatment/Interventions: Dry needling, Education/ Instruction, Electrical stimulation, Manual therapy, Neuromuscular re-education, Self care/ home management, Therapeutic exercises, Taping techniques, Therapeutic activities  PT Plan: Skilled PT (Closed chain, LE strengthening, manual PRN)  PT Frequency: 2 times per week  Duration: 10 weeks  Onset Date: 02/01/24  Number of Treatments Authorized: 5/30  Rehab Potential: Good  Plan of Care Agreement: Patient    Goals:  Active       PT Problem       PT Goal 1       Start:  04/01/24    Expected End:  06/24/24       STG  1) Patient will be able to complete all normal activities with pain no greater than 1 /10 in 6 weeks.  2) Patient will be able to perform proper squatting technique in 6 weeks in order to reduce compression on knee and prevent increased pain with daily tasks.   3) Patient will be independent with HEP in 3 visits to allow for continued improvement in daily tasks at home and in the community.    LTG  1) Patient will improve LEFS to 71/80 in order to allow for greater completion of functional activities at home and in the community in 10 weeks.  2) Patient will have 5-/5 strength in lateral hip stabilizers to prevent any descending compensations required for proper gait mechanics in 10 weeks.  3) Patient will be able to perform >30 seconds of right SLS on multiple surfaces in order to allow for safe ambulation on all levels within the community in 6 weeks.            Patient Stated Goal 1       Start:  04/01/24    Expected End:  06/10/24       Strengthen R leg and lessen the pain              Jose  PABLO Saleem, PT

## 2024-04-22 ENCOUNTER — TREATMENT (OUTPATIENT)
Dept: PHYSICAL THERAPY | Facility: CLINIC | Age: 34
End: 2024-04-22
Payer: COMMERCIAL

## 2024-04-22 DIAGNOSIS — M22.2X1 RIGHT PATELLOFEMORAL SYNDROME: Primary | ICD-10-CM

## 2024-04-22 PROCEDURE — 97110 THERAPEUTIC EXERCISES: CPT | Mod: GP,CQ

## 2024-04-22 PROCEDURE — 97140 MANUAL THERAPY 1/> REGIONS: CPT | Mod: GP,CQ

## 2024-04-22 ASSESSMENT — PAIN SCALES - GENERAL: PAINLEVEL_OUTOF10: 3

## 2024-04-22 ASSESSMENT — PAIN - FUNCTIONAL ASSESSMENT: PAIN_FUNCTIONAL_ASSESSMENT: 0-10

## 2024-04-22 NOTE — PROGRESS NOTES
"  Physical Therapy Treatment    Patient Name: Yumi Richard  MRN: [id number]  Today's Date: 4/22/2024  Time Calculation  Start Time: 1427  Stop Time: 1517  Time Calculation (min): 50 min   ,      Current Problem  1. Right patellofemoral syndrome  Follow Up In Physical Therapy          Insurance:  Number of Treatments Authorized: 6/30            Subjective   General  Reason for Referral: R knee pain  Referred By: Mary Ellen Hale MD  Past Medical History Relevant to Rehab:  (Reviewed pt past medical history - RLM)  General Comment: Patient notes her knee is better than it was last week.  Her foot ware has made a huge difference.    Performing HEP?: Yes    Precautions  Precautions  STEADI Fall Risk Score (The score of 4 or more indicates an increased risk of falling): 0  Precautions Comment: None  Pain  Pain Assessment: 0-10  Pain Score: 3    Objective   + POP R inf/lat patella    Treatments:    Therapeutic Exercise  Therapeutic Exercise Activity 1: Sportsarc lvl 3 x 6 min  Therapeutic Exercise Activity 2: incline g/s stretch x 1 min  Therapeutic Exercise Activity 3: dynamic: knee hug x 2, quad pull x 2, open/close gait, ankle swipe, tin soldier  Therapeutic Exercise Activity 4: R/L SL leg press 40# 3 x 10 ea  Therapeutic Exercise Activity 5: Hip/knee 90/90 with iso hip ER into ball-wall 10\" hold x 3 ea side  Therapeutic Exercise Activity 6: TRX Squats 10\" x 10  Therapeutic Exercise Activity 7: YTB loop footworm R/L x 40' ea  Therapeutic Exercise Activity 8: YTB loop ZigZag F/B x 40' ea         Manual Therapy  Manual Therapy Activity 1: R PF mobs  Manual Therapy Activity 2: DSTM quad, ITB, andre-patellar    Therapeutic Activity  Therapeutic Activity 1: ALT R/L BOSU lunge with 2# ball chest press x 2 min                OP EDUCATION:       Assessment:  PT Assessment  Assessment Comment: Patient experienced hypersensitive to touch at he R lat/inf andre-patella.  Able to reduce sensitivity with manual releases.  Increased " awareness of knee valgus with CKC knee flexion.    Plan:  OP PT Plan  Treatment/Interventions: Dry needling, Education/ Instruction, Electrical stimulation, Manual therapy, Neuromuscular re-education, Self care/ home management, Therapeutic exercises, Taping techniques, Therapeutic activities  PT Plan: Skilled PT (Closed chain, LE strengthening, manual PRN)  PT Frequency: 2 times per week  Duration: 10 weeks  Onset Date: 02/01/24  Number of Treatments Authorized: 6/30  Rehab Potential: Good  Plan of Care Agreement: Patient    Goals:  Active       PT Problem       PT Goal 1       Start:  04/01/24    Expected End:  06/24/24       STG  1) Patient will be able to complete all normal activities with pain no greater than 1 /10 in 6 weeks.  2) Patient will be able to perform proper squatting technique in 6 weeks in order to reduce compression on knee and prevent increased pain with daily tasks.   3) Patient will be independent with HEP in 3 visits to allow for continued improvement in daily tasks at home and in the community.    LTG  1) Patient will improve LEFS to 71/80 in order to allow for greater completion of functional activities at home and in the community in 10 weeks.  2) Patient will have 5-/5 strength in lateral hip stabilizers to prevent any descending compensations required for proper gait mechanics in 10 weeks.  3) Patient will be able to perform >30 seconds of right SLS on multiple surfaces in order to allow for safe ambulation on all levels within the community in 6 weeks.            Patient Stated Goal 1       Start:  04/01/24    Expected End:  06/10/24       Strengthen R leg and lessen the pain              Carmella Parker, PTA

## 2024-04-29 ENCOUNTER — TREATMENT (OUTPATIENT)
Dept: PHYSICAL THERAPY | Facility: CLINIC | Age: 34
End: 2024-04-29
Payer: COMMERCIAL

## 2024-04-29 DIAGNOSIS — M22.2X1 RIGHT PATELLOFEMORAL SYNDROME: ICD-10-CM

## 2024-04-29 PROCEDURE — 97110 THERAPEUTIC EXERCISES: CPT | Mod: GP | Performed by: PHYSICAL THERAPIST

## 2024-04-29 PROCEDURE — 97140 MANUAL THERAPY 1/> REGIONS: CPT | Mod: GP | Performed by: PHYSICAL THERAPIST

## 2024-04-29 ASSESSMENT — PAIN - FUNCTIONAL ASSESSMENT: PAIN_FUNCTIONAL_ASSESSMENT: 0-10

## 2024-04-29 ASSESSMENT — PAIN SCALES - GENERAL: PAINLEVEL_OUTOF10: 3

## 2024-04-29 NOTE — PROGRESS NOTES
"  Physical Therapy Treatment    Patient Name: Yumi Richard  MRN: [id number]  Today's Date: 4/29/2024  Time Calculation  Start Time: 0830  Stop Time: 0925  Time Calculation (min): 55 min  PT Therapeutic Procedures Time Entry  Manual Therapy Time Entry: 24  Therapeutic Exercise Time Entry: 24  Therapeutic Activity Time Entry: 5,      Current Problem  1. Right patellofemoral syndrome  Follow Up In Physical Therapy            Insurance:  Payor:  EMPLOYEE MEDICAL PLAN / Plan:  EMPLOYEE MEDICAL PLAN TRADITIONAL  / Product Type: *No Product type* /   Number of Treatments Authorized: 7/30          Subjective   General  Reason for Referral: R knee pain  Referred By: Mary Ellen Hale MD  Past Medical History Relevant to Rehab:  (Reviewed pt past medical history - RLM)  General Comment: Patient states that she is feeling the aching less often though at the end of the day after work she feels it the most.    Performing HEP?: Yes    Precautions  Precautions  STEADI Fall Risk Score (The score of 4 or more indicates an increased risk of falling): 0  Precautions Comment: None  Pain  Pain Assessment: 0-10  Pain Score: 3    Objective   TTP inferior patellar pole    Treatments:    Therapeutic Exercise  Therapeutic Exercise Activity 1: Sportsarc lvl 3 x 6 min  Therapeutic Exercise Activity 2: dynamic: knee hug x 2, quad pull x 2, open/close gait, ankle swipe, tin soldier  Therapeutic Exercise Activity 3: SL sit to stand 3 x 6 ea LE  Therapeutic Exercise Activity 4: Standing clamshell yellow loop 3 x 12 ea LE         Manual Therapy  Manual Therapy Activity 1: R PF mobs  Manual Therapy Activity 2: DSTM quad, ITB, andre-patellar    Therapeutic Activity  Therapeutic Activity 1: 6\" Lateral tap down 3 x 10 ea LE              OP EDUCATION:  Outpatient Education  Education Comment: HEP progression: SL sit to stand, lateral tap down, Standing clam green loop    Assessment:  PT Assessment  PT Assessment Results: Decreased strength, Decreased " range of motion, Impaired balance, Decreased mobility, Pain  Assessment Comment: Patient with difficulty with lower extremity endurance with higher level single-leg activity.  Minimal pain or valgus collapse with tap downs or sit to stand on the right though did struggle compared to the left.  Remains tender along inferior patellar pole leg extended though 90 degree bend.    Plan:  OP PT Plan  Treatment/Interventions: Dry needling, Education/ Instruction, Electrical stimulation, Manual therapy, Neuromuscular re-education, Self care/ home management, Therapeutic exercises, Taping techniques, Therapeutic activities  PT Plan: Skilled PT (Closed chain, LE strengthening, manual PRN)  PT Frequency: 2 times per week  Duration: 10 weeks  Onset Date: 02/01/24  Number of Treatments Authorized: 7/30  Rehab Potential: Good  Plan of Care Agreement: Patient    Goals:  Active       PT Problem       PT Goal 1       Start:  04/01/24    Expected End:  06/24/24       STG  1) Patient will be able to complete all normal activities with pain no greater than 1 /10 in 6 weeks.  2) Patient will be able to perform proper squatting technique in 6 weeks in order to reduce compression on knee and prevent increased pain with daily tasks.   3) Patient will be independent with HEP in 3 visits to allow for continued improvement in daily tasks at home and in the community.    LTG  1) Patient will improve LEFS to 71/80 in order to allow for greater completion of functional activities at home and in the community in 10 weeks.  2) Patient will have 5-/5 strength in lateral hip stabilizers to prevent any descending compensations required for proper gait mechanics in 10 weeks.  3) Patient will be able to perform >30 seconds of right SLS on multiple surfaces in order to allow for safe ambulation on all levels within the community in 6 weeks.            Patient Stated Goal 1       Start:  04/01/24    Expected End:  06/10/24       Strengthen R leg and  lessen the pain              Jose Saleem, PT

## 2024-05-03 ENCOUNTER — TREATMENT (OUTPATIENT)
Dept: PHYSICAL THERAPY | Facility: CLINIC | Age: 34
End: 2024-05-03
Payer: COMMERCIAL

## 2024-05-03 DIAGNOSIS — M22.2X1 RIGHT PATELLOFEMORAL SYNDROME: ICD-10-CM

## 2024-05-03 PROCEDURE — 97110 THERAPEUTIC EXERCISES: CPT | Mod: GP | Performed by: PHYSICAL THERAPIST

## 2024-05-03 PROCEDURE — 97140 MANUAL THERAPY 1/> REGIONS: CPT | Mod: GP | Performed by: PHYSICAL THERAPIST

## 2024-05-03 ASSESSMENT — PAIN - FUNCTIONAL ASSESSMENT: PAIN_FUNCTIONAL_ASSESSMENT: 0-10

## 2024-05-03 ASSESSMENT — PAIN SCALES - GENERAL: PAINLEVEL_OUTOF10: 2

## 2024-05-06 ENCOUNTER — TREATMENT (OUTPATIENT)
Dept: PHYSICAL THERAPY | Facility: CLINIC | Age: 34
End: 2024-05-06
Payer: COMMERCIAL

## 2024-05-06 DIAGNOSIS — M22.2X1 RIGHT PATELLOFEMORAL SYNDROME: ICD-10-CM

## 2024-05-06 PROCEDURE — 97110 THERAPEUTIC EXERCISES: CPT | Mod: GP | Performed by: PHYSICAL THERAPIST

## 2024-05-06 PROCEDURE — 97112 NEUROMUSCULAR REEDUCATION: CPT | Mod: GP | Performed by: PHYSICAL THERAPIST

## 2024-05-06 PROCEDURE — 97140 MANUAL THERAPY 1/> REGIONS: CPT | Mod: GP | Performed by: PHYSICAL THERAPIST

## 2024-05-06 ASSESSMENT — PAIN SCALES - GENERAL: PAINLEVEL_OUTOF10: 4

## 2024-05-06 ASSESSMENT — PAIN - FUNCTIONAL ASSESSMENT: PAIN_FUNCTIONAL_ASSESSMENT: 0-10

## 2024-05-07 NOTE — PROGRESS NOTES
"  Physical Therapy Treatment    Patient Name: Yumi Richard  MRN: [id number]  Today's Date: 5/6/2024  Time Calculation  Start Time: 1400  Stop Time: 1445  Time Calculation (min): 45 min  PT Therapeutic Procedures Time Entry  Manual Therapy Time Entry: 10  Neuromuscular Re-Education Time Entry: 6  Therapeutic Exercise Time Entry: 26,      Current Problem  1. Right patellofemoral syndrome  Follow Up In Physical Therapy            Insurance:  Payor:  EMPLOYEE MEDICAL PLAN / Plan:  EMPLOYEE MEDICAL PLAN TRADITIONAL  / Product Type: *No Product type* /   Number of Treatments Authorized: 9/30          Subjective   General  Reason for Referral: R knee pain  Referred By: Mary Ellen Hale MD  Past Medical History Relevant to Rehab:  (Reviewed pt past medical history - RLM)  General Comment: Patient states that her knee is more sore today. Notes tightness in her upper thigh.    Performing HEP?: Yes    Precautions  Precautions  STEADI Fall Risk Score (The score of 4 or more indicates an increased risk of falling): 0  Precautions Comment: None  Pain  Pain Assessment: 0-10  Pain Score: 4  Pain Type: Acute pain  Pain Location: Knee    Objective       General Observation  General Observation: TTP inferior patella    Treatments:    Therapeutic Exercise  Therapeutic Exercise Activity 1: Sportsarc lvl 3 x 6 min  Therapeutic Exercise Activity 2: dynamic: knee hug x 2, quad pull x 2, open/close gait, ankle swipe  Therapeutic Exercise Activity 3: Leg extension 3 x 12 20#  Therapeutic Exercise Activity 4: HS curl 40# 3 x 8    Balance/Neuromuscular Re-Education  Balance/Neuromuscular Re-Education Activity 1: Rocker lateral taps x 2 min  Balance/Neuromuscular Re-Education Activity 2: Rocker Mini squat 2 x 10 3\" hold    Manual Therapy  Manual Therapy Activity 1: R PF mobs  Manual Therapy Activity 2: DSTM quad, ITB, andre-patellar      Assessment:  PT Assessment  PT Assessment Results: Decreased strength, Decreased range of motion, " Impaired balance, Decreased mobility, Pain  Assessment Comment: Patient with increased R LE pain and increased fatigue. Increased atrophy in distal quadriceps noted. Overall progressing well and cued patient for hip ER during squat to allow for improved patellar tracking.    Plan:  OP PT Plan  Treatment/Interventions: Dry needling, Education/ Instruction, Electrical stimulation, Manual therapy, Neuromuscular re-education, Self care/ home management, Therapeutic exercises, Taping techniques, Therapeutic activities  PT Plan: Skilled PT (LE strengthening, Manual PRN)  PT Frequency: 2 times per week  Duration: 10 weeks  Onset Date: 02/01/24  Number of Treatments Authorized: 9/30  Rehab Potential: Good  Plan of Care Agreement: Patient    Goals:  Active       PT Problem       PT Goal 1       Start:  04/01/24    Expected End:  06/24/24       STG  1) Patient will be able to complete all normal activities with pain no greater than 1 /10 in 6 weeks.  2) Patient will be able to perform proper squatting technique in 6 weeks in order to reduce compression on knee and prevent increased pain with daily tasks.   3) Patient will be independent with HEP in 3 visits to allow for continued improvement in daily tasks at home and in the community.    LTG  1) Patient will improve LEFS to 71/80 in order to allow for greater completion of functional activities at home and in the community in 10 weeks.  2) Patient will have 5-/5 strength in lateral hip stabilizers to prevent any descending compensations required for proper gait mechanics in 10 weeks.  3) Patient will be able to perform >30 seconds of right SLS on multiple surfaces in order to allow for safe ambulation on all levels within the community in 6 weeks.            Patient Stated Goal 1       Start:  04/01/24    Expected End:  06/10/24       Strengthen R leg and lessen the pain              Jose Saleem, PT

## 2024-05-08 ENCOUNTER — TREATMENT (OUTPATIENT)
Dept: PHYSICAL THERAPY | Facility: CLINIC | Age: 34
End: 2024-05-08
Payer: COMMERCIAL

## 2024-05-08 DIAGNOSIS — M22.2X1 RIGHT PATELLOFEMORAL SYNDROME: Primary | ICD-10-CM

## 2024-05-08 PROCEDURE — 97140 MANUAL THERAPY 1/> REGIONS: CPT | Mod: GP,CQ

## 2024-05-08 PROCEDURE — 97110 THERAPEUTIC EXERCISES: CPT | Mod: GP,CQ

## 2024-05-08 ASSESSMENT — PAIN SCALES - GENERAL: PAINLEVEL_OUTOF10: 3

## 2024-05-08 ASSESSMENT — PAIN - FUNCTIONAL ASSESSMENT: PAIN_FUNCTIONAL_ASSESSMENT: 0-10

## 2024-05-08 NOTE — PROGRESS NOTES
"  Physical Therapy Treatment    Patient Name: Yumi Richard  MRN: [id number]  Today's Date: 5/8/2024  Time Calculation  Start Time: 0750  Stop Time: 0845  Time Calculation (min): 55 min   ,      Current Problem  1. Right patellofemoral syndrome  Follow Up In Physical Therapy          Insurance:  Number of Treatments Authorized: 10/30            Subjective   General  Reason for Referral: R knee pain  Referred By: Mary Ellen Hale MD  Past Medical History Relevant to Rehab:  (Reviewed pt past medical history - RLM)  General Comment: Her knee is most comfortable in a semi flexed position due to tightness felt behind R knee.  R LE feels more weak and unsteady with SLS activities. Notes some clincking in the R knee today.    Performing HEP?: Yes    Precautions  Precautions  STEADI Fall Risk Score (The score of 4 or more indicates an increased risk of falling): 0  Precautions Comment: None  Pain  Pain Assessment: 0-10  Pain Score: 3 (4/10 with use)  Pain Type: Acute pain  Pain Location: Knee    Objective       Treatments:    Therapeutic Exercise  Therapeutic Exercise Activity 1: Sportsarc lvl 3 x 6 min  Therapeutic Exercise Activity 2: dynamic: knee hug x 2, quad pull x 2, open/close gait, ankle swipe  Therapeutic Exercise Activity 3: seated R semi loaded knee ext mobs 5\" x 10  Therapeutic Exercise Activity 4: SUMO squats with 10# KB 2 x 10  Therapeutic Exercise Activity 5: AI HS stretch L x 5    Balance/Neuromuscular Re-Education  Balance/Neuromuscular Re-Education Activity 1: MARTIX 10# R/L RDL 2 x 10 ea  Balance/Neuromuscular Re-Education Activity 2: BOSU step up with opp high knee with pause x 2 min    Manual Therapy  Manual Therapy Activity 1: R PF mobs  Manual Therapy Activity 2: DSTM/CFM distal HS, ADD, quad  Manual Therapy Activity 3: stretch HS, ADD         Modalities  Modality 1: Untimed Cold Pack (L knee x 12' - bell (N/C))           OP EDUCATION:       Assessment:  PT Assessment  Assessment Comment: Patient " with increased medial knee/ pes anserine area discomfort this date.  Patient responded well to HS stretching and STM.  R>L SL activities challenging, but improved.  + Relief with CP. Advise for home use.    Plan:  OP PT Plan  Treatment/Interventions: Dry needling, Education/ Instruction, Electrical stimulation, Manual therapy, Neuromuscular re-education, Self care/ home management, Therapeutic exercises, Taping techniques, Therapeutic activities  PT Plan: Skilled PT (LE strengthening, Manual PRN)  PT Frequency: 2 times per week  Duration: 10 weeks  Onset Date: 02/01/24  Number of Treatments Authorized: 10/30  Rehab Potential: Good  Plan of Care Agreement: Patient    Goals:  Active       PT Problem       PT Goal 1       Start:  04/01/24    Expected End:  06/24/24       STG  1) Patient will be able to complete all normal activities with pain no greater than 1 /10 in 6 weeks.  2) Patient will be able to perform proper squatting technique in 6 weeks in order to reduce compression on knee and prevent increased pain with daily tasks.   3) Patient will be independent with HEP in 3 visits to allow for continued improvement in daily tasks at home and in the community.    LTG  1) Patient will improve LEFS to 71/80 in order to allow for greater completion of functional activities at home and in the community in 10 weeks.  2) Patient will have 5-/5 strength in lateral hip stabilizers to prevent any descending compensations required for proper gait mechanics in 10 weeks.  3) Patient will be able to perform >30 seconds of right SLS on multiple surfaces in order to allow for safe ambulation on all levels within the community in 6 weeks.            Patient Stated Goal 1       Start:  04/01/24    Expected End:  06/10/24       Strengthen R leg and lessen the pain              Carmella Parker, PTA

## 2024-05-16 ENCOUNTER — TREATMENT (OUTPATIENT)
Dept: PHYSICAL THERAPY | Facility: CLINIC | Age: 34
End: 2024-05-16
Payer: COMMERCIAL

## 2024-05-16 DIAGNOSIS — M22.2X1 RIGHT PATELLOFEMORAL SYNDROME: ICD-10-CM

## 2024-05-16 PROCEDURE — 97110 THERAPEUTIC EXERCISES: CPT | Mod: GP | Performed by: PHYSICAL THERAPIST

## 2024-05-16 PROCEDURE — 97140 MANUAL THERAPY 1/> REGIONS: CPT | Mod: GP | Performed by: PHYSICAL THERAPIST

## 2024-05-16 ASSESSMENT — PAIN SCALES - GENERAL: PAINLEVEL_OUTOF10: 5 - MODERATE PAIN

## 2024-05-16 ASSESSMENT — PAIN - FUNCTIONAL ASSESSMENT: PAIN_FUNCTIONAL_ASSESSMENT: 0-10

## 2024-05-16 NOTE — PROGRESS NOTES
"  Physical Therapy Treatment    Patient Name: Yumi Richard  MRN: [id number]  Today's Date: 5/16/2024  Time Calculation  Start Time: 1032  Stop Time: 1115  Time Calculation (min): 43 min  PT Therapeutic Procedures Time Entry  Manual Therapy Time Entry: 20  Therapeutic Exercise Time Entry: 21,      Current Problem  1. Right patellofemoral syndrome  Follow Up In Physical Therapy            Insurance:  Payor:  EMPLOYEE MEDICAL PLAN / Plan:  EMPLOYEE MEDICAL PLAN TRADITIONAL  / Product Type: *No Product type* /   Number of Treatments Authorized: 11/30          Subjective   General  Reason for Referral: R knee pain  Referred By: Mary Ellen Hale MD  Past Medical History Relevant to Rehab:  (Reviewed pt past medical history - RLM)  General Comment: Patient states that she worked 6 shifts in 7 days. Notes that the first 3 days went well then over the next 3 shifts the knee was more and more sore.    Performing HEP?: Yes    Precautions  Precautions  STEADI Fall Risk Score (The score of 4 or more indicates an increased risk of falling): 0  Precautions Comment: None  Pain  Pain Assessment: 0-10  Pain Score: 5 - Moderate pain  Pain Type: Acute pain  Pain Location: Knee    Objective   TTP R quadriceps    Treatments:    Therapeutic Exercise  Therapeutic Exercise Activity 1: Sportsarc lvl 3 x 6 min  Therapeutic Exercise Activity 2: dynamic: knee hug x 2, quad pull x 2, open/close gait, ankle swipe  Therapeutic Exercise Activity 3: Matrix retro walking 12.5# x 15  Therapeutic Exercise Activity 4: Wall runner march 5\" hold x 10 ea  Therapeutic Exercise Activity 5: Toe raise with eccentric lower x 20         Manual Therapy  Manual Therapy Activity 1: R PF mobs  Manual Therapy Activity 2: IASTM to R quadriceps in HL    OP EDUCATION:  Outpatient Education  Education Comment: Quad stretch in standing    Assessment:  PT Assessment  PT Assessment Results: Decreased strength, Decreased range of motion, Impaired balance, Decreased " mobility, Pain  Assessment Comment: Patient with increased pain and discomfort in right knee following prolonged standing and work shifts.  Discussed with patient importance of quadricep strengthening as well as stretching following her shift.  Minor reduction in quadriceps resting tone and tightness though minimal change in pain.  Discussed dry needling benefits and gave patient handouts for potential neck session.    Plan:  OP PT Plan  Treatment/Interventions: Dry needling, Education/ Instruction, Electrical stimulation, Manual therapy, Neuromuscular re-education, Self care/ home management, Therapeutic exercises, Taping techniques, Therapeutic activities  PT Plan: Skilled PT (LE strengthening, Manual PRN)  PT Frequency: 2 times per week  Duration: 10 weeks  Onset Date: 02/01/24  Number of Treatments Authorized: 11/30  Rehab Potential: Good  Plan of Care Agreement: Patient    Goals:  Active       PT Problem       PT Goal 1       Start:  04/01/24    Expected End:  06/24/24       STG  1) Patient will be able to complete all normal activities with pain no greater than 1 /10 in 6 weeks.  2) Patient will be able to perform proper squatting technique in 6 weeks in order to reduce compression on knee and prevent increased pain with daily tasks.   3) Patient will be independent with HEP in 3 visits to allow for continued improvement in daily tasks at home and in the community.    LTG  1) Patient will improve LEFS to 71/80 in order to allow for greater completion of functional activities at home and in the community in 10 weeks.  2) Patient will have 5-/5 strength in lateral hip stabilizers to prevent any descending compensations required for proper gait mechanics in 10 weeks.  3) Patient will be able to perform >30 seconds of right SLS on multiple surfaces in order to allow for safe ambulation on all levels within the community in 6 weeks.            Patient Stated Goal 1       Start:  04/01/24    Expected End:  06/10/24        Strengthen R leg and lessen the pain              Jose Saleem, PT

## 2024-05-20 ENCOUNTER — TREATMENT (OUTPATIENT)
Dept: PHYSICAL THERAPY | Facility: CLINIC | Age: 34
End: 2024-05-20
Payer: COMMERCIAL

## 2024-05-20 DIAGNOSIS — M22.2X1 RIGHT PATELLOFEMORAL SYNDROME: ICD-10-CM

## 2024-05-20 PROCEDURE — 97110 THERAPEUTIC EXERCISES: CPT | Mod: GP | Performed by: PHYSICAL THERAPIST

## 2024-05-20 PROCEDURE — 97140 MANUAL THERAPY 1/> REGIONS: CPT | Mod: GP | Performed by: PHYSICAL THERAPIST

## 2024-05-20 ASSESSMENT — PAIN - FUNCTIONAL ASSESSMENT: PAIN_FUNCTIONAL_ASSESSMENT: 0-10

## 2024-05-20 ASSESSMENT — PAIN SCALES - GENERAL: PAINLEVEL_OUTOF10: 3

## 2024-05-20 NOTE — PROGRESS NOTES
"  Physical Therapy Treatment    Patient Name: Yumi Richard  MRN: [id number]  Today's Date: 5/20/2024  Time Calculation  Start Time: 0745  Stop Time: 0830  Time Calculation (min): 45 min  PT Therapeutic Procedures Time Entry  Manual Therapy Time Entry: 30  Therapeutic Exercise Time Entry: 11,      Current Problem  1. Right patellofemoral syndrome  Follow Up In Physical Therapy            Insurance:  Payor:  EMPLOYEE MEDICAL PLAN / Plan:  EMPLOYEE MEDICAL PLAN TRADITIONAL  / Product Type: *No Product type* /   Number of Treatments Authorized: 12/30          Subjective   General  Reason for Referral: R knee pain  Referred By: Mary Ellen Hale MD  Past Medical History Relevant to Rehab: DN Consent signed 5/20/2024 (Reviewed pt past medical history - RLM)  General Comment: Patient states that her knee is feeling okay today. Much better than she felt last week.    Performing HEP?: Yes    Precautions  Precautions  STEADI Fall Risk Score (The score of 4 or more indicates an increased risk of falling): 0  Precautions Comment: None  Pain  Pain Assessment: 0-10  Pain Score: 3  Pain Type: Acute pain  Pain Location: Knee    Objective     General Observation  General Observation: TTP R quadriceps    Treatments:    Therapeutic Exercise  Therapeutic Exercise Activity 1: Sportsarc lvl 3 x 6 min  Therapeutic Exercise Activity 2: dynamic: knee hug x 2, quad pull x 2, open/close gait, ankle swipe  Therapeutic Exercise Activity 3: Total gym lvl 7 1 cord squat with 3\" pause 3 x 12         Manual Therapy  Manual Therapy Activity 1: R PF mobs  Manual Therapy Activity 2: See Below  Clean field utilized with trigger point dry needling with DDN:  Supine, x 8 40 mm needles in R quadriceps, x 3 30 mm framing medial patella  Concurrent 2Hz estim to R quadriceps  using needle manipulations with pistoning, tenting and winding at restrictions (mm twitches present)   STM/DTM utilized between each needle insertion to all muscle groups DDN  (TrDN " x 3 min.).      Assessment:  PT Assessment  PT Assessment Results: Decreased strength, Decreased range of motion, Impaired balance, Decreased mobility, Pain  Assessment Comment: Patient with good response to DN this session with improved soft tissue mobility. Continue to focus on quadriceps strengthening and isometrics for reduced pain and improved stability. Overall, progressing well with current POC.    Plan:  OP PT Plan  Treatment/Interventions: Dry needling, Education/ Instruction, Electrical stimulation, Manual therapy, Neuromuscular re-education, Self care/ home management, Therapeutic exercises, Taping techniques, Therapeutic activities  PT Plan: Skilled PT (LE strengthening, Manual PRN)  PT Frequency: 2 times per week  Duration: 10 weeks  Onset Date: 02/01/24  Number of Treatments Authorized: 12/30  Rehab Potential: Good  Plan of Care Agreement: Patient    Goals:  Active       PT Problem       PT Goal 1       Start:  04/01/24    Expected End:  06/24/24       STG  1) Patient will be able to complete all normal activities with pain no greater than 1 /10 in 6 weeks.  2) Patient will be able to perform proper squatting technique in 6 weeks in order to reduce compression on knee and prevent increased pain with daily tasks.   3) Patient will be independent with HEP in 3 visits to allow for continued improvement in daily tasks at home and in the community.    LTG  1) Patient will improve LEFS to 71/80 in order to allow for greater completion of functional activities at home and in the community in 10 weeks.  2) Patient will have 5-/5 strength in lateral hip stabilizers to prevent any descending compensations required for proper gait mechanics in 10 weeks.  3) Patient will be able to perform >30 seconds of right SLS on multiple surfaces in order to allow for safe ambulation on all levels within the community in 6 weeks.            Patient Stated Goal 1       Start:  04/01/24    Expected End:  06/10/24        Strengthen R leg and lessen the pain              Jose Saleem, PT

## 2024-05-24 ENCOUNTER — TREATMENT (OUTPATIENT)
Dept: PHYSICAL THERAPY | Facility: CLINIC | Age: 34
End: 2024-05-24
Payer: COMMERCIAL

## 2024-05-24 DIAGNOSIS — M22.2X1 RIGHT PATELLOFEMORAL SYNDROME: ICD-10-CM

## 2024-05-24 PROCEDURE — 97110 THERAPEUTIC EXERCISES: CPT | Mod: GP | Performed by: PHYSICAL THERAPIST

## 2024-05-24 PROCEDURE — 97140 MANUAL THERAPY 1/> REGIONS: CPT | Mod: GP | Performed by: PHYSICAL THERAPIST

## 2024-05-24 ASSESSMENT — PAIN SCALES - GENERAL: PAINLEVEL_OUTOF10: 4

## 2024-05-24 ASSESSMENT — PAIN - FUNCTIONAL ASSESSMENT: PAIN_FUNCTIONAL_ASSESSMENT: 0-10

## 2024-05-24 NOTE — PROGRESS NOTES
"  Physical Therapy Treatment    Patient Name: Yumi Richard  MRN: [id number]  Today's Date: 5/24/2024  Time Calculation  Start Time: 1300  Stop Time: 1346  Time Calculation (min): 46 min  PT Therapeutic Procedures Time Entry  Manual Therapy Time Entry: 10  Neuromuscular Re-Education Time Entry: 5  Therapeutic Exercise Time Entry: 29,      Current Problem  1. Right patellofemoral syndrome  Follow Up In Physical Therapy            Insurance:  Payor:  EMPLOYEE MEDICAL PLAN / Plan:  EMPLOYEE MEDICAL PLAN TRADITIONAL  / Product Type: *No Product type* /   Number of Treatments Authorized: 13/30          Subjective   General  Reason for Referral: R knee pain  Referred By: Mary Ellen Hale MD  Past Medical History Relevant to Rehab: DN Consent signed 5/20/2024 (Reviewed pt past medical history - RLM)  General Comment: Patient states that she is feeling more globally sore. Notes that she felt much looser in her quad after last session with DN. Minor tightness in the back of her knee.    Performing HEP?: Yes    Precautions  Precautions  STEADI Fall Risk Score (The score of 4 or more indicates an increased risk of falling): 0  Precautions Comment: None  Pain  Pain Assessment: 0-10  Pain Score: 4  Pain Type: Acute pain  Pain Location: Knee    Objective     General Observation  General Observation: Cues for great toe engagement.    Treatments:    Therapeutic Exercise  Therapeutic Exercise Activity 1: Sportsarc lvl 3 x 6 min  Therapeutic Exercise Activity 2: dynamic: knee hug x 2, quad pull x 2, open/close gait, ankle swipe  Therapeutic Exercise Activity 3: Wall sit with alternating 5\" kick out 2 x 5 ea LE  Therapeutic Exercise Activity 4: Micronesian split squat 10# KB with 2\" iso 3 x 8 ea LE  Therapeutic Exercise Activity 5: KB Deadlift to 6\" step 3 x 6 35#    Balance/Neuromuscular Re-Education  Balance/Neuromuscular Re-Education Activity 1: 12\" fwd step up iso holds in flexion 3 x 5\" ea LE x 3 sets    Manual Therapy  Manual " Therapy Activity 1: R PF mobs      OP EDUCATION:  Outpatient Education  Education Comment: Access Code: [email or web address]  URL: [email or web address]  Date: 05/24/2024  Prepared by: Jose Saleem    Exercises  - Unilateral Wall Sit  - 1 x daily - 7 x weekly - 3 sets - 5 reps - 5 sec hold    Assessment:  PT Assessment  PT Assessment Results: Decreased strength, Decreased range of motion, Impaired balance, Decreased mobility, Pain  Assessment Comment: Patient with increased R quadriceps weakness and instability though no pain in knee. Increased valgus collapse with step up isometrics though improved with cues to ground great toe. Progressed HEP for wall sits and will add split squats next session if patients pain remains reduced.    Plan:  OP PT Plan  Treatment/Interventions: Dry needling, Education/ Instruction, Electrical stimulation, Manual therapy, Neuromuscular re-education, Self care/ home management, Therapeutic exercises, Taping techniques, Therapeutic activities  PT Plan: Skilled PT (LE strengthening, Manual PRN)  PT Frequency: 2 times per week  Duration: 10 weeks  Onset Date: 02/01/24  Number of Treatments Authorized: 13/30  Rehab Potential: Good  Plan of Care Agreement: Patient    Goals:  Active       PT Problem       PT Goal 1       Start:  04/01/24    Expected End:  06/24/24       STG  1) Patient will be able to complete all normal activities with pain no greater than 1 /10 in 6 weeks.  2) Patient will be able to perform proper squatting technique in 6 weeks in order to reduce compression on knee and prevent increased pain with daily tasks.   3) Patient will be independent with HEP in 3 visits to allow for continued improvement in daily tasks at home and in the community.    LTG  1) Patient will improve LEFS to 71/80 in order to allow for greater completion of functional activities at home and in the community in 10 weeks.  2) Patient will have 5-/5 strength in lateral hip stabilizers to  prevent any descending compensations required for proper gait mechanics in 10 weeks.  3) Patient will be able to perform >30 seconds of right SLS on multiple surfaces in order to allow for safe ambulation on all levels within the community in 6 weeks.            Patient Stated Goal 1       Start:  04/01/24    Expected End:  06/10/24       Strengthen R leg and lessen the pain              Jose Saleem, PT

## 2024-06-04 ENCOUNTER — TREATMENT (OUTPATIENT)
Dept: PHYSICAL THERAPY | Facility: CLINIC | Age: 34
End: 2024-06-04
Payer: COMMERCIAL

## 2024-06-04 DIAGNOSIS — M22.2X1 RIGHT PATELLOFEMORAL SYNDROME: ICD-10-CM

## 2024-06-04 PROCEDURE — 97110 THERAPEUTIC EXERCISES: CPT | Mod: GP | Performed by: PHYSICAL THERAPIST

## 2024-06-04 PROCEDURE — 97140 MANUAL THERAPY 1/> REGIONS: CPT | Mod: GP | Performed by: PHYSICAL THERAPIST

## 2024-06-04 ASSESSMENT — PAIN SCALES - GENERAL: PAINLEVEL_OUTOF10: 4

## 2024-06-04 ASSESSMENT — PAIN - FUNCTIONAL ASSESSMENT: PAIN_FUNCTIONAL_ASSESSMENT: 0-10

## 2024-06-04 NOTE — PROGRESS NOTES
Physical Therapy Treatment    Patient Name: Yumi Richard  MRN: [id number]  Today's Date: 6/4/2024  Time Calculation  Start Time: 0947  Stop Time: 1032  Time Calculation (min): 45 min  PT Therapeutic Procedures Time Entry  Manual Therapy Time Entry: 15  Neuromuscular Re-Education Time Entry: 5  Therapeutic Exercise Time Entry: 23,      Current Problem  1. Right patellofemoral syndrome  Follow Up In Physical Therapy            Insurance:  Payor:  EMPLOYEE MEDICAL PLAN / Plan:  EMPLOYEE MEDICAL PLAN TRADITIONAL  / Product Type: *No Product type* /   Number of Treatments Authorized: 14/30          Subjective   General  Reason for Referral: R knee pain  Referred By: Mary Ellen Hale MD  Past Medical History Relevant to Rehab: DN Consent signed 5/20/2024 (Reviewed pt past medical history - RLM)  General Comment: Patient states that at work she had increased pain yesterday. Notes pain level prior to that were at 2-3 so back to her baseline prior to flare.    Performing HEP?: Yes    Precautions  Precautions  STEADI Fall Risk Score (The score of 4 or more indicates an increased risk of falling): 0  Precautions Comment: None  Pain  Pain Assessment: 0-10  Pain Score: 4  Pain Type: Acute pain  Pain Location: Knee  Pain Orientation: Anterior, Right, Inner    Objective     General Observation  General Observation: TTP R HS and proximal gastroc    Treatments:    Therapeutic Exercise  Therapeutic Exercise Activity 1: Sportsarc lvl 3 x 6 min  Therapeutic Exercise Activity 2: dynamic: knee hug x 2, quad pull x 2, open/close gait, ankle swipe  Therapeutic Exercise Activity 3: Landmine lunge 3 x 6 ea LE    Balance/Neuromuscular Re-Education  Balance/Neuromuscular Re-Education Activity 1: SL RDL 10# KB 3 x 8    Manual Therapy  Manual Therapy Activity 1: R PF mobs  Manual Therapy Activity 2: STM: R patellar tendon  Manual Therapy Activity 3: STM: Prone to HS and proximal gastroc soleus      Assessment:  PT Assessment  PT  Assessment Results: Decreased strength, Decreased range of motion, Impaired balance, Decreased mobility, Pain  Assessment Comment: Patient required increased rest time with progressed weighted exercises due to lower extremity fatigue.  Improved right knee pain with retrostep back on ladder 2 steps though did require minor upper extremity support with single-leg deadlifts due to instability.  Improved pain noted with manual therapy especially to posterior chain indicating likely involvement to joint mobility and abduction.  Instructed patient on foam rolling at home for proper posterior chain release over stretching.    Plan:  OP PT Plan  Treatment/Interventions: Dry needling, Education/ Instruction, Electrical stimulation, Manual therapy, Neuromuscular re-education, Self care/ home management, Therapeutic exercises, Taping techniques, Therapeutic activities  PT Plan: Skilled PT (LE strengthening, Manual PRN)  PT Frequency: 2 times per week  Duration: 10 weeks  Onset Date: 02/01/24  Number of Treatments Authorized: 14/30  Rehab Potential: Good  Plan of Care Agreement: Patient    Goals:  Active       PT Problem       PT Goal 1       Start:  04/01/24    Expected End:  06/24/24       STG  1) Patient will be able to complete all normal activities with pain no greater than 1 /10 in 6 weeks.  2) Patient will be able to perform proper squatting technique in 6 weeks in order to reduce compression on knee and prevent increased pain with daily tasks.   3) Patient will be independent with HEP in 3 visits to allow for continued improvement in daily tasks at home and in the community.    LTG  1) Patient will improve LEFS to 71/80 in order to allow for greater completion of functional activities at home and in the community in 10 weeks.  2) Patient will have 5-/5 strength in lateral hip stabilizers to prevent any descending compensations required for proper gait mechanics in 10 weeks.  3) Patient will be able to perform >30  seconds of right SLS on multiple surfaces in order to allow for safe ambulation on all levels within the community in 6 weeks.            Patient Stated Goal 1       Start:  04/01/24    Expected End:  06/10/24       Strengthen R leg and lessen the pain              Jose Saleem, PT

## 2024-06-13 ENCOUNTER — TREATMENT (OUTPATIENT)
Dept: PHYSICAL THERAPY | Facility: CLINIC | Age: 34
End: 2024-06-13
Payer: COMMERCIAL

## 2024-06-13 DIAGNOSIS — M22.2X1 RIGHT PATELLOFEMORAL SYNDROME: ICD-10-CM

## 2024-06-13 PROCEDURE — 97140 MANUAL THERAPY 1/> REGIONS: CPT | Mod: GP | Performed by: PHYSICAL THERAPIST

## 2024-06-13 PROCEDURE — 97112 NEUROMUSCULAR REEDUCATION: CPT | Mod: GP | Performed by: PHYSICAL THERAPIST

## 2024-06-13 PROCEDURE — 97110 THERAPEUTIC EXERCISES: CPT | Mod: GP | Performed by: PHYSICAL THERAPIST

## 2024-06-13 ASSESSMENT — PAIN SCALES - GENERAL: PAINLEVEL_OUTOF10: 2

## 2024-06-13 ASSESSMENT — PAIN - FUNCTIONAL ASSESSMENT: PAIN_FUNCTIONAL_ASSESSMENT: 0-10

## 2024-06-13 NOTE — PROGRESS NOTES
"  Physical Therapy Treatment    Patient Name: Yumi Richard  MRN: [id number]  Today's Date: 6/13/2024  Time Calculation  Start Time: 0945  Stop Time: 1030  Time Calculation (min): 45 min  PT Therapeutic Procedures Time Entry  Manual Therapy Time Entry: 8  Neuromuscular Re-Education Time Entry: 12  Therapeutic Exercise Time Entry: 15  Therapeutic Activity Time Entry: 6,      Current Problem  1. Right patellofemoral syndrome  Follow Up In Physical Therapy            Insurance:  Payor:  EMPLOYEE MEDICAL PLAN / Plan:  EMPLOYEE MEDICAL PLAN TRADITIONAL  / Product Type: *No Product type* /   Number of Treatments Authorized: 15/30          Subjective   General  Reason for Referral: R knee pain  Referred By: Mary Ellen Hale MD  Past Medical History Relevant to Rehab: DN Consent signed 5/20/2024 (Reviewed pt past medical history - RL)  General Comment: Patient states that she has had a good response since last visit. Notes that her knee has been better with work even with running all over.    Performing HEP?: Yes    Precautions  Precautions  STEADI Fall Risk Score (The score of 4 or more indicates an increased risk of falling): 0  Precautions Comment: None  Pain  Pain Assessment: 0-10  Pain Score: 2  Pain Type: Acute pain    Objective     General Observation  General Observation: Minimal shift with DL    Treatments:    Therapeutic Exercise  Therapeutic Exercise Activity 1: Sportsarc lvl 3 x 6 min  Therapeutic Exercise Activity 2: dynamic: knee hug, quad pull, open/close gait, ankle swipe  Therapeutic Exercise Activity 3: Trap bar DL 78# x 5    Balance/Neuromuscular Re-Education  Balance/Neuromuscular Re-Education Activity 1: 12\" step up with slow eccentric 4 x 6 ea LE 5# ea UE  Balance/Neuromuscular Re-Education Activity 2: Landmine RDL bar only 3 x 5 ea    Manual Therapy  Manual Therapy Activity 1: STM: R patellar tendon    Therapeutic Activity  Therapeutic Activity 1: Anterior slider to posterior lunge 2 x 5 ea " LE      OP EDUCATION:  Outpatient Education  Education Comment: Access Code: [email or web address]  URL: [email or web address]  Date: 06/13/2024  Prepared by: Jose Saleem    Exercises  - Runner's Step Up/Down  - 1 x daily - 7 x weekly - 3 sets - 10 reps    Assessment:  PT Assessment  PT Assessment Results: Decreased strength, Decreased range of motion, Impaired balance, Decreased mobility, Pain  Assessment Comment: Patient continues to have difficulty with increased loading to lower extremity though no increase in pain.  Focused on single-leg and double leg hinge movements for proper hip loading as well as quadricep strengthening.  Progressed home program with greater resistance with clamshell in standing as well as an eccentric retrostepdown for valgus control of the right lower extremity.    Plan:  OP PT Plan  Treatment/Interventions: Dry needling, Education/ Instruction, Electrical stimulation, Manual therapy, Neuromuscular re-education, Self care/ home management, Therapeutic exercises, Taping techniques, Therapeutic activities  PT Plan: Skilled PT (LE strengthening, Manual PRN)  PT Frequency: 2 times per week  Duration: 10 weeks  Onset Date: 02/01/24  Number of Treatments Authorized: 15/30  Rehab Potential: Good  Plan of Care Agreement: Patient    Goals:  Active       PT Problem       PT Goal 1       Start:  04/01/24    Expected End:  06/24/24       STG  1) Patient will be able to complete all normal activities with pain no greater than 1 /10 in 6 weeks.  2) Patient will be able to perform proper squatting technique in 6 weeks in order to reduce compression on knee and prevent increased pain with daily tasks.   3) Patient will be independent with HEP in 3 visits to allow for continued improvement in daily tasks at home and in the community.    LTG  1) Patient will improve LEFS to 71/80 in order to allow for greater completion of functional activities at home and in the community in 10  weeks.  2) Patient will have 5-/5 strength in lateral hip stabilizers to prevent any descending compensations required for proper gait mechanics in 10 weeks.  3) Patient will be able to perform >30 seconds of right SLS on multiple surfaces in order to allow for safe ambulation on all levels within the community in 6 weeks.            Patient Stated Goal 1       Start:  04/01/24    Expected End:  06/10/24       Strengthen R leg and lessen the pain              Jose Saleem, PT

## 2024-06-14 DIAGNOSIS — N30.00 ACUTE CYSTITIS WITHOUT HEMATURIA: Primary | ICD-10-CM

## 2024-06-14 RX ORDER — NITROFURANTOIN 25; 75 MG/1; MG/1
100 CAPSULE ORAL 2 TIMES DAILY
Qty: 10 CAPSULE | Refills: 1 | Status: SHIPPED | OUTPATIENT
Start: 2024-06-14 | End: 2024-06-19

## 2024-06-17 ENCOUNTER — TREATMENT (OUTPATIENT)
Dept: PHYSICAL THERAPY | Facility: CLINIC | Age: 34
End: 2024-06-17
Payer: COMMERCIAL

## 2024-06-17 DIAGNOSIS — M22.2X1 RIGHT PATELLOFEMORAL SYNDROME: ICD-10-CM

## 2024-06-17 PROCEDURE — 97112 NEUROMUSCULAR REEDUCATION: CPT | Mod: GP | Performed by: PHYSICAL THERAPIST

## 2024-06-17 PROCEDURE — 97110 THERAPEUTIC EXERCISES: CPT | Mod: GP | Performed by: PHYSICAL THERAPIST

## 2024-06-17 ASSESSMENT — PAIN SCALES - GENERAL: PAINLEVEL_OUTOF10: 2

## 2024-06-17 ASSESSMENT — PAIN - FUNCTIONAL ASSESSMENT: PAIN_FUNCTIONAL_ASSESSMENT: 0-10

## 2024-06-17 NOTE — PROGRESS NOTES
"  Physical Therapy Treatment    Patient Name: Yumi Richard  MRN: [id number]  Today's Date: 6/17/2024  Time Calculation  Start Time: 0745  Stop Time: 0830  Time Calculation (min): 45 min  PT Therapeutic Procedures Time Entry  Neuromuscular Re-Education Time Entry: 13  Therapeutic Exercise Time Entry: 29,      Current Problem  1. Right patellofemoral syndrome  Follow Up In Physical Therapy            Insurance:  Payor:  EMPLOYEE MEDICAL PLAN / Plan:  EMPLOYEE MEDICAL PLAN TRADITIONAL  / Product Type: *No Product type* /   Number of Treatments Authorized: 15/30          Subjective   General  Reason for Referral: R knee pain  Referred By: Mary Ellen Hale MD  Past Medical History Relevant to Rehab: DN Consent signed 5/20/2024 (Reviewed pt past medical history - RLM)  General Comment: Patient states that her knee is feeling better. Notes that she still feels a click in her knee going from bent to straight.    Performing HEP?: Yes    Precautions  Precautions  STEADI Fall Risk Score (The score of 4 or more indicates an increased risk of falling): 0  Precautions Comment: None  Pain  Pain Assessment: 0-10  Pain Score: 2    Objective     General Observation  General Observation: Valgus collapse on R with anterior tap down    Treatments:    Therapeutic Exercise  Therapeutic Exercise Activity 1: Sportsarc lvl 3 x 6 min  Therapeutic Exercise Activity 2: dynamic: knee hug, quad pull, open/close gait, ankle swipe  Therapeutic Exercise Activity 3: HS bridge on chair 4 x 5 ea LE  Therapeutic Exercise Activity 4: Leg press 60# x 8, 65# 2 x 8 ea    Balance/Neuromuscular Re-Education  Balance/Neuromuscular Re-Education Activity 1: 4\" anterior tap down into retro depth lunge 3 x 5 ea 10#  Balance/Neuromuscular Re-Education Activity 2: 12\" step up with isometric hold 10\" x 6 ea LE      Assessment:  PT Assessment  PT Assessment Results: Decreased strength, Decreased range of motion, Impaired balance, Decreased mobility, " Pain  Assessment Comment: Patient required increased cues to prevent valgus collapse on the R compared to the L. Able to press more weight under fatigue with leg press as well as hold a longer isometric with reduced hip strategy. Overall progressing well with increased loading and reduction in LE pain. 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    Plan:  OP PT Plan  Treatment/Interventions: Dry needling, Education/ Instruction, Electrical stimulation, Manual therapy, Neuromuscular re-education, Self care/ home management, Therapeutic exercises, Taping techniques, Therapeutic activities  PT Plan: Skilled PT (LE strengthening, Manual PRN, progressive over loading)  PT Frequency: 2 times per week  Duration: 10 weeks  Onset Date: 02/01/24  Number of Treatments Authorized: 15/30  Rehab Potential: Good  Plan of Care Agreement: Patient    Goals:  Active       PT Problem       PT Goal 1       Start:  04/01/24    Expected End:  06/24/24       STG  1) Patient will be able to complete all normal activities with pain no greater than 1 /10 in 6 weeks.  2) Patient will be able to perform proper squatting technique in 6 weeks in order to reduce compression on knee and prevent increased pain with daily tasks.   3) Patient will be independent with HEP in 3 visits to allow for continued improvement in daily tasks at home and in the community.    LTG  1) Patient will improve LEFS to 71/80 in order to allow for greater completion of functional activities at home and in the community in 10 weeks.  2) Patient will have 5-/5 strength in lateral hip stabilizers to prevent any descending compensations required for proper gait mechanics in 10 weeks.  3) Patient will be able to perform >30 seconds of right SLS on multiple surfaces in order to allow for safe ambulation on all levels within the community in 6 weeks.            Patient Stated Goal 1       Start:  04/01/24    Expected End:  06/10/24        Strengthen R leg and lessen the pain              Jose Saleem, PT

## 2024-06-24 ENCOUNTER — TREATMENT (OUTPATIENT)
Dept: PHYSICAL THERAPY | Facility: CLINIC | Age: 34
End: 2024-06-24
Payer: COMMERCIAL

## 2024-06-24 DIAGNOSIS — M22.2X1 RIGHT PATELLOFEMORAL SYNDROME: ICD-10-CM

## 2024-06-24 PROCEDURE — 97140 MANUAL THERAPY 1/> REGIONS: CPT | Mod: GP | Performed by: PHYSICAL THERAPIST

## 2024-06-24 PROCEDURE — 97110 THERAPEUTIC EXERCISES: CPT | Mod: GP | Performed by: PHYSICAL THERAPIST

## 2024-06-24 ASSESSMENT — PAIN - FUNCTIONAL ASSESSMENT: PAIN_FUNCTIONAL_ASSESSMENT: 0-10

## 2024-06-24 ASSESSMENT — PAIN SCALES - GENERAL: PAINLEVEL_OUTOF10: 3

## 2024-06-24 NOTE — PROGRESS NOTES
"  Physical Therapy Treatment    Patient Name: Yumi Richard  MRN: [id number]  Today's Date: 6/24/2024  Time Calculation  Start Time: 0745  Stop Time: 0833  Time Calculation (min): 48 min  PT Therapeutic Procedures Time Entry  Manual Therapy Time Entry: 15  Therapeutic Exercise Time Entry: 25  Therapeutic Activity Time Entry: 5,      Current Problem  1. Right patellofemoral syndrome  Follow Up In Physical Therapy            Insurance:  Payor:  EMPLOYEE MEDICAL PLAN / Plan:  EMPLOYEE MEDICAL PLAN TRADITIONAL  / Product Type: *No Product type* /   Number of Treatments Authorized: 16/30          Subjective   General  Reason for Referral: R knee pain  Referred By: Mary Ellen Hale MD  Past Medical History Relevant to Rehab: DN Consent signed 5/20/2024 (Reviewed pt past medical history - RLM)  General Comment: Patient states that her knee was good after last visit with driving. However, by the end of the weekend, the front of her knee really hurt.    Performing HEP?: Yes    Precautions  Precautions  STEADI Fall Risk Score (The score of 4 or more indicates an increased risk of falling): 0  Precautions Comment: None  Pain  Pain Assessment: 0-10  0-10 (Numeric) Pain Score: 3    Objective     General Observation  General Observation: Increased medial patellar tenderness    Treatments:    Therapeutic Exercise  Therapeutic Exercise Activity 1: Sportsarc lvl 3 x 6 min  Therapeutic Exercise Activity 2: dynamic: knee hug, quad pull, open/close gait, ankle swipe  Therapeutic Exercise Activity 3: Total gym lvl 4 DL squat x 10, SL squat 4 x 10 ea  Therapeutic Exercise Activity 4: Psoas stretch kneeling x 1 min R, followed by quadricpes stretch in half kneeling x 1 min    Balance/Neuromuscular Re-Education  Balance/Neuromuscular Re-Education Activity 1: 6\" deficit lunge 2 x 8 ea LE    Manual Therapy  Manual Therapy Activity 1: STM: R patellar tendon      OP EDUCATION:  Outpatient Education  Education Comment: Access Code: [email or web address]  URL: [email or web address]  Date: 06/24/2024  Prepared by: Jose Saleem    Exercises  - Half Kneeling Hip Flexor Stretch  - 2 x daily - 7 x weekly - 1 sets - 2 reps - 1 min hold    Assessment:  PT Assessment  PT Assessment Results: Decreased strength, Decreased range of motion, Impaired balance, Decreased mobility, Pain  Assessment Comment: Patient with improved tolerance to manual therapy with increased pressure performed for tissue release and tendon remodeling.  Patient able to progress further and single-leg strengthening on Total Gym with slight reduction in force on the right as well as increased fatigue.  Reduced pain with lunges as right being the trail leg following psoas stretch and quadricep stretch.    Plan:  OP PT Plan  Treatment/Interventions: Dry needling, Education/ Instruction, Electrical stimulation, Manual therapy, Neuromuscular re-education, Self care/ home management, Therapeutic exercises, Taping techniques, Therapeutic activities  PT Plan: Skilled PT (LE strengthening, Manual PRN, progressive over loading)  PT Frequency: 2 times per week  Duration: 10 weeks  Onset Date: 02/01/24  Number of Treatments Authorized: 16/30  Rehab Potential: Good  Plan of Care Agreement: Patient    Goals:  Active       PT Problem       PT Goal 1       Start:  04/01/24    Expected End:  06/24/24       STG  1) Patient will be able to complete all normal activities with pain no greater than 1 /10 in 6 weeks.  2) Patient will be able to perform proper squatting technique in 6 weeks in order to reduce compression on knee and prevent increased pain with daily tasks.   3) Patient will be independent with HEP in 3 visits to allow for continued improvement in daily tasks at home and in the community.    LTG  1) Patient will improve LEFS to 71/80 in order to allow for greater completion of functional activities at home and in the community in 10 weeks.  2) Patient will have 5-/5 strength in lateral hip  stabilizers to prevent any descending compensations required for proper gait mechanics in 10 weeks.  3) Patient will be able to perform >30 seconds of right SLS on multiple surfaces in order to allow for safe ambulation on all levels within the community in 6 weeks.            Patient Stated Goal 1       Start:  04/01/24    Expected End:  06/10/24       Strengthen R leg and lessen the pain              Jose Saleem, PT

## 2024-06-28 ENCOUNTER — TREATMENT (OUTPATIENT)
Dept: PHYSICAL THERAPY | Facility: CLINIC | Age: 34
End: 2024-06-28
Payer: COMMERCIAL

## 2024-06-28 DIAGNOSIS — M22.2X1 RIGHT PATELLOFEMORAL SYNDROME: ICD-10-CM

## 2024-06-28 PROCEDURE — 97140 MANUAL THERAPY 1/> REGIONS: CPT | Mod: GP | Performed by: PHYSICAL THERAPIST

## 2024-06-28 PROCEDURE — 97110 THERAPEUTIC EXERCISES: CPT | Mod: GP | Performed by: PHYSICAL THERAPIST

## 2024-06-28 ASSESSMENT — PAIN SCALES - GENERAL: PAINLEVEL_OUTOF10: 2

## 2024-06-28 ASSESSMENT — PAIN - FUNCTIONAL ASSESSMENT: PAIN_FUNCTIONAL_ASSESSMENT: 0-10

## 2024-06-28 NOTE — PROGRESS NOTES
"  Physical Therapy Treatment    Patient Name: Yumi Richard  MRN: [id number]  Today's Date: 6/28/2024  Time Calculation  Start Time: 0730  Stop Time: 0815  Time Calculation (min): 45 min  PT Therapeutic Procedures Time Entry  Manual Therapy Time Entry: 5  Therapeutic Exercise Time Entry: 33  Therapeutic Activity Time Entry: 4,      Current Problem  1. Right patellofemoral syndrome  Follow Up In Physical Therapy            Insurance:  Payor:  EMPLOYEE MEDICAL PLAN / Plan:  EMPLOYEE MEDICAL PLAN TRADITIONAL  / Product Type: *No Product type* /   Number of Treatments Authorized: 17/30          Subjective   General  Reason for Referral: R knee pain  Referred By: Mary Ellen Hale MD  Past Medical History Relevant to Rehab: DN Consent signed 5/20/2024 (Reviewed pt past medical history - RL)  General Comment: Patient states that she sprinted this week. Notes that one day she had some weakness in her leg.    Performing HEP?: Yes    Precautions  Precautions  STEADI Fall Risk Score (The score of 4 or more indicates an increased risk of falling): 0  Precautions Comment: None  Pain  Pain Assessment: 0-10  0-10 (Numeric) Pain Score: 2    Objective     General Observation  General Observation: Patellar discomfort with hip extension and max quadriceps stretch    Treatments:    Therapeutic Exercise  Therapeutic Exercise Activity 1: Sportsarc lvl 3 x 6 min  Therapeutic Exercise Activity 2: dynamic: knee hug, quad pull, open/close gait, ankle swipe  Therapeutic Exercise Activity 3: Total gym lvl 5 1 cord DL squats x 10, SL 3 x 10 ea  Therapeutic Exercise Activity 4: Kick stand DL 20# 3 x 8 ea  Therapeutic Exercise Activity 5: Mid thigh DL pull 95# x 4  Therapeutic Exercise Activity 6: Prone quad stretch with hip extension    Balance/Neuromuscular Re-Education  Balance/Neuromuscular Re-Education Activity 1: 6\" deficit lunge to march x 5 ea    Manual Therapy  Manual Therapy Activity 1: STM: R patellar tendon    Assessment:  PT " Assessment  PT Assessment Results: Decreased strength, Decreased range of motion, Impaired balance, Decreased mobility, Pain  Assessment Comment: Patient with great improvement in lower extremity mechanics with minimal reminders for valgus control.  Improved lower extremity strength with the ability to pull greater weight from mid flexion stance.  Discussed with patient likely weight shifting more onto the right due to better confidence but also causing a feeling of instability due to bearing more weight more consistently.  Overall function seems to be progressing well with ability to run without any difficulty and lower levels of overall pain.    Plan:  OP PT Plan  Treatment/Interventions: Dry needling, Education/ Instruction, Electrical stimulation, Manual therapy, Neuromuscular re-education, Self care/ home management, Therapeutic exercises, Taping techniques, Therapeutic activities  PT Plan: Skilled PT (LE strengthening, Manual PRN, progressive over loading)  PT Frequency: 2 times per week  Duration: 10 weeks  Onset Date: 02/01/24  Number of Treatments Authorized: 17/30  Rehab Potential: Good  Plan of Care Agreement: Patient    Goals:  Active       PT Problem       PT Goal 1       Start:  04/01/24    Expected End:  06/24/24       STG  1) Patient will be able to complete all normal activities with pain no greater than 1 /10 in 6 weeks.  2) Patient will be able to perform proper squatting technique in 6 weeks in order to reduce compression on knee and prevent increased pain with daily tasks.   3) Patient will be independent with HEP in 3 visits to allow for continued improvement in daily tasks at home and in the community.    LTG  1) Patient will improve LEFS to 71/80 in order to allow for greater completion of functional activities at home and in the community in 10 weeks.  2) Patient will have 5-/5 strength in lateral hip stabilizers to prevent any descending compensations required for proper gait mechanics in  10 weeks.  3) Patient will be able to perform >30 seconds of right SLS on multiple surfaces in order to allow for safe ambulation on all levels within the community in 6 weeks.            Patient Stated Goal 1       Start:  04/01/24    Expected End:  06/10/24       Strengthen R leg and lessen the pain              Jose Saleem, PT

## 2024-07-01 ENCOUNTER — APPOINTMENT (OUTPATIENT)
Dept: PHYSICAL THERAPY | Facility: CLINIC | Age: 34
End: 2024-07-01
Payer: COMMERCIAL

## 2024-07-02 ENCOUNTER — TREATMENT (OUTPATIENT)
Dept: PHYSICAL THERAPY | Facility: CLINIC | Age: 34
End: 2024-07-02
Payer: COMMERCIAL

## 2024-07-02 DIAGNOSIS — M22.2X1 RIGHT PATELLOFEMORAL SYNDROME: ICD-10-CM

## 2024-07-02 PROCEDURE — 97140 MANUAL THERAPY 1/> REGIONS: CPT | Mod: GP,CQ

## 2024-07-02 PROCEDURE — 97110 THERAPEUTIC EXERCISES: CPT | Mod: GP,CQ

## 2024-07-02 ASSESSMENT — PAIN - FUNCTIONAL ASSESSMENT: PAIN_FUNCTIONAL_ASSESSMENT: 0-10

## 2024-07-02 ASSESSMENT — PAIN SCALES - GENERAL: PAINLEVEL_OUTOF10: 3

## 2024-07-02 NOTE — PROGRESS NOTES
"  Physical Therapy Treatment    Patient Name: Yumi Richard  MRN: [id number]  Today's Date: 7/2/2024  Time Calculation  Start Time: 0746  Stop Time: 0830  Time Calculation (min): 44 min   ,      Current Problem  1. Right patellofemoral syndrome  Follow Up In Physical Therapy          Insurance:  Number of Treatments Authorized: 18/30            Subjective   General  Reason for Referral: R knee pain  Referred By: Mary Ellen Hale MD  Past Medical History Relevant to Rehab: DN Consent signed 5/20/2024 (Reviewed pt past medical history - RLM)  General Comment: Patient reports her knee pain is a bit elevated today.  She did a lot of driving yesterday.  Her whole leg feels tight.  Did not stretch as much as she should have yesterday.    Performing HEP?: Yes    Precautions  Precautions  STEADI Fall Risk Score (The score of 4 or more indicates an increased risk of falling): 0  Precautions Comment: None  Pain  Pain Assessment: 0-10  0-10 (Numeric) Pain Score: 3    Objective       Treatments:    Therapeutic Exercise  Therapeutic Exercise Activity 1: Sportsarc lvl 3 x 6 min  Therapeutic Exercise Activity 2: seated R LAQ with pause after IASTM x 10  Therapeutic Exercise Activity 3: sidelying AI HF stretch x 3  Therapeutic Exercise Activity 4: standing ITB stretch on wall 2 x 30\"  Therapeutic Exercise Activity 5: dynamics - knee hug, quad pull, ankle swipe, HF  Therapeutic Exercise Activity 6: instruct in self tissue release with rolling stick    Balance/Neuromuscular Re-Education  Balance/Neuromuscular Re-Education Activity 1: 6\" deficit lunge to march x 8 ea    Manual Therapy  Manual Therapy Activity 1: IASTM R patellar tendon, distal quad  Manual Therapy Activity 2: DSTM/TPR R lateral quad, ITB, TFL, RF  Manual Therapy Activity 3: MFR R iliopsoas release                     OP EDUCATION:  Outpatient Education  Education Comment: Access Code: [email or web address]  URL: [email or web address]  Date: 07/02/2024  " Prepared by: Roz Parker    Exercises  - ITB Stretch at Wall  - 1-2 x daily - 7 x weekly - 2 reps - 30 hold  - Standing Hip Flexor Stretch  - 1-2 x daily - 7 x weekly - 2 reps - 30 hold    Assessment:  PT Assessment  Assessment Comment: Today's session focused on soft tissue adhesions and releases.  Many areas of tightness identified.  Encouraged patient to perform self rolling at home.  Felt much looser after session.  Was able to perform dificit lunge without pain.  Advised she strethc more frequently on the days she is in the car for extended periods of time.    Plan:  OP PT Plan  Treatment/Interventions: Dry needling, Education/ Instruction, Electrical stimulation, Manual therapy, Neuromuscular re-education, Self care/ home management, Therapeutic exercises, Taping techniques, Therapeutic activities  PT Plan: Skilled PT (LE strengthening, Manual PRN, progressive over loading)  PT Frequency: 2 times per week  Duration: 10 weeks  Onset Date: 02/01/24  Number of Treatments Authorized: 18/30  Rehab Potential: Good  Plan of Care Agreement: Patient    Goals:  Active       PT Problem       PT Goal 1       Start:  04/01/24    Expected End:  06/24/24       STG  1) Patient will be able to complete all normal activities with pain no greater than 1 /10 in 6 weeks.  2) Patient will be able to perform proper squatting technique in 6 weeks in order to reduce compression on knee and prevent increased pain with daily tasks.   3) Patient will be independent with HEP in 3 visits to allow for continued improvement in daily tasks at home and in the community.    LTG  1) Patient will improve LEFS to 71/80 in order to allow for greater completion of functional activities at home and in the community in 10 weeks.  2) Patient will have 5-/5 strength in lateral hip stabilizers to prevent any descending compensations required for proper gait mechanics in 10 weeks.  3) Patient will be able to perform >30 seconds of right SLS on multiple  surfaces in order to allow for safe ambulation on all levels within the community in 6 weeks.            Patient Stated Goal 1       Start:  04/01/24    Expected End:  06/10/24       Strengthen R leg and lessen the pain              Carmella Parker, PTA

## 2024-07-12 ENCOUNTER — TREATMENT (OUTPATIENT)
Dept: PHYSICAL THERAPY | Facility: CLINIC | Age: 34
End: 2024-07-12
Payer: COMMERCIAL

## 2024-07-12 DIAGNOSIS — M22.2X1 RIGHT PATELLOFEMORAL SYNDROME: ICD-10-CM

## 2024-07-12 PROCEDURE — 97110 THERAPEUTIC EXERCISES: CPT | Mod: GP | Performed by: PHYSICAL THERAPIST

## 2024-07-12 PROCEDURE — 97140 MANUAL THERAPY 1/> REGIONS: CPT | Mod: GP | Performed by: PHYSICAL THERAPIST

## 2024-07-12 PROCEDURE — 97112 NEUROMUSCULAR REEDUCATION: CPT | Mod: GP | Performed by: PHYSICAL THERAPIST

## 2024-07-12 ASSESSMENT — PAIN SCALES - GENERAL: PAINLEVEL_OUTOF10: 1

## 2024-07-12 ASSESSMENT — PAIN - FUNCTIONAL ASSESSMENT: PAIN_FUNCTIONAL_ASSESSMENT: 0-10

## 2024-07-12 NOTE — PROGRESS NOTES
Physical Therapy Treatment/Progress Note    Patient Name: Yumi Richard  MRN: [id number]  Today's Date: 7/12/2024  Time Calculation  Start Time: 1018  Stop Time: 1119  Time Calculation (min): 61 min  PT Therapeutic Procedures Time Entry  Manual Therapy Time Entry: 18  Neuromuscular Re-Education Time Entry: 6  Therapeutic Exercise Time Entry: 30,      Current Problem  1. Right patellofemoral syndrome  Follow Up In Physical Therapy            Insurance:  Payor:  EMPLOYEE MEDICAL PLAN / Plan:  EMPLOYEE MEDICAL PLAN TRADITIONAL  / Product Type: *No Product type* /   Number of Treatments Authorized: 20/30 (Progress Note)          Subjective   General  Reason for Referral: R knee pain  Referred By: Mary Ellen Hale MD  Past Medical History Relevant to Rehab: DN Consent signed 5/20/2024 (Reviewed pt past medical history - RLM)  General Comment: Patient states that overall her pain has been pretty good. Notes that she has been able to do more with less pain and has not had to think about her knee as often.    Performing HEP?: Yes    Precautions  Precautions  STEADI Fall Risk Score (The score of 4 or more indicates an increased risk of falling): 0  Precautions Comment: None  Pain  Pain Assessment: 0-10  0-10 (Numeric) Pain Score: 1    Objective   KNEE    Knee Palpation/Joint Mobility  Palpation/Joint Mobility Comment: TTP inferior patellar pole at tendon insertion    Knee MMT  R knee flexion: (5/5): 15.4 kg HHD (1.7% deficit)  L knee flexion: (5/5): 15.7 kg HHD  R knee extension: (5/5): 24.7 kg HHD (6.9% advantage)  L knee extension: (5/5): 23 kg HHD    Gait  Gait Comment: No valgus collapse with lunge or anterior step down    Outcome Measures:  Other Measures  Lower Extremity Funtional Score (LEFS): 70/80    Treatments:    Therapeutic Exercise  Therapeutic Exercise Activity 1: Sportsarc lvl 3 x 6 min  Therapeutic Exercise Activity 2: dynamic: knee hug, quad pull, open/close gait, ankle swipe  Therapeutic Exercise  Activity 3: Total gym lvl 7 DL squat x 10, 3 x 10 SL ea  Therapeutic Exercise Activity 4: Split squat lunge 5# KB ea UE 2 x 8 ea  Therapeutic Exercise Activity 5: Lateral lunge and return 5# KB ea UE 2 x 5 ea LE  Therapeutic Exercise Activity 6: Knee flexion and extension max effort isometric at 90 x 5 ea, ea LE    Balance/Neuromuscular Re-Education  Balance/Neuromuscular Re-Education Activity 1: SL RDL 10# 2 x 8 ea    Manual Therapy  Manual Therapy Activity 1: DSTM/TPR R lateral quad, ITB, TFL, RF  Manual Therapy Activity 2: Strumming to R patellar tendon in extension and flexion    OP EDUCATION:  Outpatient Education  Education Comment: Access Code: [email or web address]  URL: [email or web address]  Date: 07/12/2024  Prepared by: Jose Saleem    Exercises  - Split Squats Upright Trunk (Quad Bias)  - 1 x daily - 3-4 x weekly - 3 sets - 8 reps - 2 sec hold  - Single Leg Deadlift with Kettlebell  - 1 x daily - 3-4 x weekly - 3 sets - 8 reps  - Lateral Lunge  - 1 x daily - 3-4 x weekly - 3 sets - 5-10 reps    Assessment:  PT Assessment  PT Assessment Results: Decreased strength, Decreased range of motion, Impaired balance, Decreased mobility, Pain  Assessment Comment: Patient has attended 20 physical therapy visits for right knee pain.  Patient is seen great improvement in lower extremity strength as noted with isokinetic testing as well as surpassed left lower extremity with extension strength.  These indicate greater quadriceps control as well as dynamic control with lack of valgus collapse with stepping and lunges.  However she continues to have minor episodes of pain and catching and therefore we will continue to require skilled physical therapy to address remaining deficits for full return to pain reduced function.    Plan:  OP PT Plan  Treatment/Interventions: Dry needling, Education/ Instruction, Electrical stimulation, Manual therapy, Neuromuscular re-education, Self care/ home management,  Therapeutic exercises, Taping techniques, Therapeutic activities  PT Plan: Skilled PT (LE strengthening, Manual PRN, progressive over loading)  PT Frequency: 2 times per week  Duration: 10 weeks  Onset Date: 02/01/24  Number of Treatments Authorized: 20/30 (Progress Note)  Rehab Potential: Good  Plan of Care Agreement: Patient    Goals:  Active       PT Problem       PT Goal 1       Start:  04/01/24    Expected End:  09/20/24       STG  1) Patient will be able to complete all normal activities with pain no greater than 1 /10 in 6 weeks.- partially achieved  2) Patient will be able to perform proper squatting technique in 6 weeks in order to reduce compression on knee and prevent increased pain with daily tasks. - goal met  3) Patient will be independent with HEP in 3 visits to allow for continued improvement in daily tasks at home and in the community. - goal met    LTG  1) Patient will improve LEFS to 71/80 in order to allow for greater completion of functional activities at home and in the community in 10 weeks. - in progress  2) Patient will have 5-/5 strength in lateral hip stabilizers to prevent any descending compensations required for proper gait mechanics in 10 weeks. - goal met  3) Patient will be able to perform >30 seconds of right SLS on multiple surfaces in order to allow for safe ambulation on all levels within the community in 6 weeks. - goal met           Patient Stated Goal 1       Start:  04/01/24    Expected End:  09/20/24       Strengthen R leg and lessen the pain - in progress              Jose Saleem, PT

## 2024-07-15 ENCOUNTER — TREATMENT (OUTPATIENT)
Dept: PHYSICAL THERAPY | Facility: CLINIC | Age: 34
End: 2024-07-15
Payer: COMMERCIAL

## 2024-07-15 DIAGNOSIS — M22.2X1 RIGHT PATELLOFEMORAL SYNDROME: ICD-10-CM

## 2024-07-15 PROCEDURE — 97112 NEUROMUSCULAR REEDUCATION: CPT | Mod: GP | Performed by: PHYSICAL THERAPIST

## 2024-07-15 PROCEDURE — 97110 THERAPEUTIC EXERCISES: CPT | Mod: GP | Performed by: PHYSICAL THERAPIST

## 2024-07-15 ASSESSMENT — PAIN - FUNCTIONAL ASSESSMENT: PAIN_FUNCTIONAL_ASSESSMENT: 0-10

## 2024-07-15 ASSESSMENT — PAIN SCALES - GENERAL: PAINLEVEL_OUTOF10: 1

## 2024-07-15 NOTE — PROGRESS NOTES
"  Physical Therapy Treatment    Patient Name: Yumi Richard  MRN: [id number]  Today's Date: 7/15/2024  Time Calculation  Start Time: 0700  Stop Time: 0745  Time Calculation (min): 45 min  PT Therapeutic Procedures Time Entry  Manual Therapy Time Entry: 6  Neuromuscular Re-Education Time Entry: 8  Therapeutic Exercise Time Entry: 29,      Current Problem  1. Right patellofemoral syndrome  Follow Up In Physical Therapy            Insurance:  Payor:  EMPLOYEE MEDICAL PLAN / Plan:  EMPLOYEE MEDICAL PLAN TRADITIONAL  / Product Type: *No Product type* /   Number of Treatments Authorized: 21/30          Subjective   General  Reason for Referral: R knee pain  Referred By: Mary Ellen Hale MD  Past Medical History Relevant to Rehab: DN Consent signed 5/20/2024 (Reviewed pt past medical history - RLM)  General Comment: Patient states that overall her knee is feeling better. Notes having more clicking than pain.    Performing HEP?: Yes    Precautions  Precautions  STEADI Fall Risk Score (The score of 4 or more indicates an increased risk of falling): 0  Precautions Comment: None  Pain  Pain Assessment: 0-10  0-10 (Numeric) Pain Score: 1    Objective   No valgus collapse    Treatments:    Therapeutic Exercise  Therapeutic Exercise Activity 1: Sportsarc lvl 3 x 6 min  Therapeutic Exercise Activity 2: dynamic: knee hug, quad pull, open/close gait, ankle swipe, figure 4 x 2  Therapeutic Exercise Activity 3: Leg extension 30# 3 x 10  Therapeutic Exercise Activity 4: Kickstand DL 20# 3 x 6 ea  Therapeutic Exercise Activity 5: Lunge isometric 10\" hold with forefoot on 6\" step x 4 ea LE unilateral 15# hold    Balance/Neuromuscular Re-Education  Balance/Neuromuscular Re-Education Activity 1: 18\" step up 3 x 5 ea  Balance/Neuromuscular Re-Education Activity 2: 6\" drop down to pause 3\" 3 x 5 ea    Manual Therapy  Manual Therapy Activity 1: Strumming to R patellar tendon in extension and flexion      Assessment:  PT Assessment  PT " Assessment Results: Decreased strength, Decreased range of motion, Impaired balance, Decreased mobility, Pain  Assessment Comment: Patient improved lower extremity strength and tolerance to overload.  Progressed drop downs with good dynamic valgus control.  Will continue to focus on lower extremity strengthening in various positions for return to pain-free function.    Plan:  OP PT Plan  Treatment/Interventions: Dry needling, Education/ Instruction, Electrical stimulation, Manual therapy, Neuromuscular re-education, Self care/ home management, Therapeutic exercises, Taping techniques, Therapeutic activities  PT Plan: Skilled PT (LE strengthening, Manual PRN, progressive over loading)  PT Frequency: 2 times per week  Duration: 10 weeks  Onset Date: 02/01/24  Number of Treatments Authorized: 21/30  Rehab Potential: Good  Plan of Care Agreement: Patient    Goals:  Active       PT Problem       PT Goal 1       Start:  04/01/24    Expected End:  09/20/24       STG  1) Patient will be able to complete all normal activities with pain no greater than 1 /10 in 6 weeks.- partially achieved  2) Patient will be able to perform proper squatting technique in 6 weeks in order to reduce compression on knee and prevent increased pain with daily tasks. - goal met  3) Patient will be independent with HEP in 3 visits to allow for continued improvement in daily tasks at home and in the community. - goal met    LTG  1) Patient will improve LEFS to 71/80 in order to allow for greater completion of functional activities at home and in the community in 10 weeks. - in progress  2) Patient will have 5-/5 strength in lateral hip stabilizers to prevent any descending compensations required for proper gait mechanics in 10 weeks. - goal met  3) Patient will be able to perform >30 seconds of right SLS on multiple surfaces in order to allow for safe ambulation on all levels within the community in 6 weeks. - goal met           Patient Stated Goal 1        Start:  04/01/24    Expected End:  09/20/24       Strengthen R leg and lessen the pain - in progress              Jose Saleem, PT

## 2024-07-22 PROBLEM — Z86.59 HISTORY OF DEPRESSION: Status: ACTIVE | Noted: 2024-07-22

## 2024-07-22 PROBLEM — J39.8 STENOSIS OF TRACHEA: Status: ACTIVE | Noted: 2024-07-22

## 2024-07-22 PROBLEM — R53.83 FATIGUE: Status: ACTIVE | Noted: 2024-07-22

## 2024-07-22 PROBLEM — N87.9 CERVICAL ATYPIA: Status: ACTIVE | Noted: 2024-07-22

## 2024-07-22 PROBLEM — R51.9 HEADACHE: Status: RESOLVED | Noted: 2024-07-22 | Resolved: 2024-07-22

## 2024-07-22 PROBLEM — R05.9 COUGH: Status: RESOLVED | Noted: 2024-07-22 | Resolved: 2024-07-22

## 2024-07-22 PROBLEM — J01.00 ACUTE MAXILLARY SINUSITIS: Status: RESOLVED | Noted: 2023-06-01 | Resolved: 2024-07-22

## 2024-07-24 ENCOUNTER — APPOINTMENT (OUTPATIENT)
Dept: PRIMARY CARE | Facility: CLINIC | Age: 34
End: 2024-07-24
Payer: COMMERCIAL

## 2024-07-24 ENCOUNTER — TREATMENT (OUTPATIENT)
Dept: PHYSICAL THERAPY | Facility: CLINIC | Age: 34
End: 2024-07-24
Payer: COMMERCIAL

## 2024-07-24 VITALS
HEART RATE: 85 BPM | SYSTOLIC BLOOD PRESSURE: 106 MMHG | WEIGHT: 113 LBS | HEIGHT: 62 IN | BODY MASS INDEX: 20.8 KG/M2 | OXYGEN SATURATION: 98 % | DIASTOLIC BLOOD PRESSURE: 73 MMHG

## 2024-07-24 DIAGNOSIS — T75.3XXD MOTION SICKNESS, SUBSEQUENT ENCOUNTER: ICD-10-CM

## 2024-07-24 DIAGNOSIS — F41.9 ANXIETY: Primary | ICD-10-CM

## 2024-07-24 DIAGNOSIS — M22.2X1 RIGHT PATELLOFEMORAL SYNDROME: ICD-10-CM

## 2024-07-24 DIAGNOSIS — F98.8 ATTENTION DEFICIT DISORDER (ADD) WITHOUT HYPERACTIVITY: ICD-10-CM

## 2024-07-24 PROBLEM — R53.83 FATIGUE: Status: RESOLVED | Noted: 2024-07-22 | Resolved: 2024-07-24

## 2024-07-24 PROCEDURE — 97110 THERAPEUTIC EXERCISES: CPT | Mod: GP | Performed by: PHYSICAL THERAPIST

## 2024-07-24 PROCEDURE — 97112 NEUROMUSCULAR REEDUCATION: CPT | Mod: GP | Performed by: PHYSICAL THERAPIST

## 2024-07-24 PROCEDURE — 3008F BODY MASS INDEX DOCD: CPT | Performed by: FAMILY MEDICINE

## 2024-07-24 PROCEDURE — 97140 MANUAL THERAPY 1/> REGIONS: CPT | Mod: GP | Performed by: PHYSICAL THERAPIST

## 2024-07-24 PROCEDURE — 99214 OFFICE O/P EST MOD 30 MIN: CPT | Performed by: FAMILY MEDICINE

## 2024-07-24 PROCEDURE — 1036F TOBACCO NON-USER: CPT | Performed by: FAMILY MEDICINE

## 2024-07-24 RX ORDER — BUPROPION HYDROCHLORIDE 300 MG/1
300 TABLET ORAL DAILY
Qty: 90 TABLET | Refills: 3 | Status: SHIPPED | OUTPATIENT
Start: 2024-07-24

## 2024-07-24 RX ORDER — ATOMOXETINE 80 MG/1
CAPSULE ORAL
Qty: 90 CAPSULE | Refills: 1 | Status: SHIPPED | OUTPATIENT
Start: 2024-07-24

## 2024-07-24 RX ORDER — BUPROPION HYDROCHLORIDE 150 MG/1
150 TABLET ORAL DAILY
Qty: 90 TABLET | Refills: 1 | Status: SHIPPED | OUTPATIENT
Start: 2024-07-24 | End: 2025-07-24

## 2024-07-24 RX ORDER — ONDANSETRON 4 MG/1
4 TABLET, FILM COATED ORAL EVERY 8 HOURS PRN
Qty: 20 TABLET | Refills: 1 | Status: SHIPPED | OUTPATIENT
Start: 2024-07-24

## 2024-07-24 RX ORDER — BUPROPION HYDROCHLORIDE 300 MG/1
300 TABLET ORAL DAILY
Qty: 90 TABLET | Refills: 3 | Status: SHIPPED | OUTPATIENT
Start: 2024-07-24 | End: 2024-07-24 | Stop reason: SDUPTHER

## 2024-07-24 ASSESSMENT — PAIN - FUNCTIONAL ASSESSMENT: PAIN_FUNCTIONAL_ASSESSMENT: 0-10

## 2024-07-24 ASSESSMENT — ENCOUNTER SYMPTOMS: DYSPHORIC MOOD: 0

## 2024-07-24 ASSESSMENT — PATIENT HEALTH QUESTIONNAIRE - PHQ9
SUM OF ALL RESPONSES TO PHQ9 QUESTIONS 1 AND 2: 0
1. LITTLE INTEREST OR PLEASURE IN DOING THINGS: NOT AT ALL
2. FEELING DOWN, DEPRESSED OR HOPELESS: NOT AT ALL

## 2024-07-24 ASSESSMENT — PAIN SCALES - GENERAL: PAINLEVEL_OUTOF10: 4

## 2024-07-24 NOTE — PROGRESS NOTES
"      Physical Therapy Treatment    Patient Name: Yumi Richard  MRN: [id number]  Today's Date: 7/24/2024  Time Calculation  Start Time: 1231  Stop Time: 1315  Time Calculation (min): 44 min  PT Therapeutic Procedures Time Entry  Manual Therapy Time Entry: 18  Neuromuscular Re-Education Time Entry: 6  Therapeutic Exercise Time Entry: 18,      Current Problem  1. Right patellofemoral syndrome  Follow Up In Physical Therapy            Insurance:  Payor:  EMPLOYEE MEDICAL PLAN / Plan:  EMPLOYEE MEDICAL PLAN TRADITIONAL  / Product Type: *No Product type* /   Number of Treatments Authorized: 22/30          Subjective   General  Reason for Referral: R knee pain  Referred By: Mary Ellen Hale MD  Past Medical History Relevant to Rehab: DN Consent signed 5/20/2024 (Reviewed pt past medical history - RLM)  General Comment: Patient states that she had a good week last week and home visits. Notes that she walked around the art fest without issue. Notes last night with her exercises, she had a slight pinch in her knee but more pulling soreness with stairs in her distal quads.    Performing HEP?: Yes    Precautions  Precautions  STEADI Fall Risk Score (The score of 4 or more indicates an increased risk of falling): 0  Precautions Comment: None  Pain  Pain Assessment: 0-10  0-10 (Numeric) Pain Score: 4  Pain Location: Leg  Pain Orientation: Right, Mid    Objective   Increased R distal muscle tone    Treatments:    Therapeutic Exercise  Therapeutic Exercise Activity 1: Sportsarc lvl 3 x 6 min  Therapeutic Exercise Activity 2: dynamic: knee hug, quad pull, open/close gait, ankle swipe, side lunge  Therapeutic Exercise Activity 3: Total gym lvl 7 1 cord DL squat 3\" pause 3 x 10  Therapeutic Exercise Activity 4: Incline SL heel raise x 15 ea    Balance/Neuromuscular Re-Education  Balance/Neuromuscular Re-Education Activity 1: Rocker mini squat 2 x 10 3\" pause  Balance/Neuromuscular Re-Education Activity 2: Rocker SLS 2 x 30 sec " lateral, x 30 sec ea fwd    Manual Therapy  Manual Therapy Activity 1: STM: R quadriceps  Manual Therapy Activity 2: IASTM: R quadriceps    Assessment:  PT Assessment  PT Assessment Results: Decreased strength, Decreased range of motion, Impaired balance, Decreased mobility, Pain  Assessment Comment: Patient presents with increased quadriceps tightness improved with IASTM and soft tissue mobilization.  Focus on lower level strengthening and stability exercises bilaterally and with single-leg.  Increased difficulty with single-leg activity on the right compared to the left especially balance in the frontal plane.    Plan:  OP PT Plan  Treatment/Interventions: Dry needling, Education/ Instruction, Electrical stimulation, Manual therapy, Neuromuscular re-education, Self care/ home management, Therapeutic exercises, Taping techniques, Therapeutic activities  PT Plan: Skilled PT (LE strengthening, Manual PRN, progressive over loading)  PT Frequency: 2 times per week  Duration: 10 weeks  Onset Date: 02/01/24  Number of Treatments Authorized: 22/30  Rehab Potential: Good  Plan of Care Agreement: Patient    Goals:  Active       PT Problem       PT Goal 1       Start:  04/01/24    Expected End:  09/20/24       STG  1) Patient will be able to complete all normal activities with pain no greater than 1 /10 in 6 weeks.- partially achieved  2) Patient will be able to perform proper squatting technique in 6 weeks in order to reduce compression on knee and prevent increased pain with daily tasks. - goal met  3) Patient will be independent with HEP in 3 visits to allow for continued improvement in daily tasks at home and in the community. - goal met    LTG  1) Patient will improve LEFS to 71/80 in order to allow for greater completion of functional activities at home and in the community in 10 weeks. - in progress  2) Patient will have 5-/5 strength in lateral hip stabilizers to prevent any descending compensations required for  proper gait mechanics in 10 weeks. - goal met  3) Patient will be able to perform >30 seconds of right SLS on multiple surfaces in order to allow for safe ambulation on all levels within the community in 6 weeks. - goal met           Patient Stated Goal 1       Start:  04/01/24    Expected End:  09/20/24       Strengthen R leg and lessen the pain - in progress              Jose Saleem, PT

## 2024-07-24 NOTE — PROGRESS NOTES
"Subjective   Patient ID: Yumi Richard is a 34 y.o. female who presents for Follow-up. States she would like to try going back on the Adderall; states she is very unmotivated on her days off.     ADD, and attention: On Strattera and bupropion  Anxiety     Mood is good ,  occ anxiety, does not feel like she is depressed    Taking wellbutrin 300 mg   Strattera 60 mg daily  Gets dry mouth but otherwise tolerable  Getting work done at work ,  works at hospital     Lack of motivation on  day off   With working is doing ok      Energy level is ok    Was on adderrall in past 4th -college ,  worked daily wanted to try this on her day off  Generally is functioning okay otherwise         Review of Systems   Psychiatric/Behavioral:  Negative for dysphoric mood.        Objective   /73   Pulse 85   Ht 1.575 m (5' 2\")   Wt 51.3 kg (113 lb)   SpO2 98%   BMI 20.67 kg/m²     Physical Exam  Constitutional:       Appearance: Normal appearance. She is well-developed.   Cardiovascular:      Rate and Rhythm: Normal rate and regular rhythm.      Heart sounds: Normal heart sounds. No murmur heard.  Pulmonary:      Effort: Pulmonary effort is normal.      Breath sounds: Normal breath sounds.   Neurological:      General: No focal deficit present.      Mental Status: She is alert and oriented to person, place, and time.   Psychiatric:         Mood and Affect: Mood normal.         Behavior: Behavior normal.         Assessment/Plan   Problem List Items Addressed This Visit             ICD-10-CM    ADD (attention deficit disorder) F98.8    Relevant Medications    atomoxetine (Strattera) 80 mg capsule    Anxiety - Primary F41.9    Relevant Medications    buPROPion XL (Wellbutrin XL) 300 mg 24 hr tablet    buPROPion XL (Wellbutrin XL) 150 mg 24 hr tablet    Motion sickness T75.3XXA    Relevant Medications    ondansetron (Zofran) 4 mg tablet         Anxiety suboptimal control  :  increase bupropion     Increase strattera  see how " side effects       Follow up in 6-8 weeks     Discussed the pros and cons of the medication, generally I do not think it is worth the side effects to add on an additional medication when she is doing well on her workdays and only having some breakthrough symptoms on her day off

## 2024-07-25 ENCOUNTER — TELEPHONE (OUTPATIENT)
Dept: PRIMARY CARE | Facility: CLINIC | Age: 34
End: 2024-07-25
Payer: COMMERCIAL

## 2024-07-25 NOTE — TELEPHONE ENCOUNTER
GLORIA/[place] is asking for Clarification on the Strattera its stated as 80 mg capsule but is also stating to take one capsule (60mg) by mouth once daily. Please call [phone]

## 2024-07-25 NOTE — TELEPHONE ENCOUNTER
MD Sho Cast MA  Caller: Unspecified (Today, 11:22 AM)  Sorry, it should be 80 mg once daily          Previous Messages    MEDICATION CLARIFICATION  (Newest Message First)  View All Conversations on this Encounter   Mary Ellen Hale MD  You5 minutes ago (12:36 PM)       Sorry, it should be 80 mg once daily      You routed conversation to Mary Ellen Hale MD1 hour ago (11:33 AM)      Phyllis Lin routed conversation to Do Gloria[address] Clinical Support Staff1 hour ago (11:25 AM)     Phyllis Lin1 hour ago (11:25 AM)     YANNA CASTRO/[place] is asking for Clarification on the Strattera its stated as 80 mg capsule but is also stating to take one capsule (60mg) by mouth once daily. Please call [phone]         Note        [id number] - [place], [place] - [address] [facility] [place] [facility] [place] [phone]  Phyllis Lin1 hour ago (11:22 AM)   Spoked w/ GLORIA. [place]

## 2024-08-01 ENCOUNTER — TREATMENT (OUTPATIENT)
Dept: PHYSICAL THERAPY | Facility: CLINIC | Age: 34
End: 2024-08-01
Payer: COMMERCIAL

## 2024-08-01 DIAGNOSIS — M22.2X1 RIGHT PATELLOFEMORAL SYNDROME: ICD-10-CM

## 2024-08-01 PROCEDURE — 97110 THERAPEUTIC EXERCISES: CPT | Mod: GP | Performed by: PHYSICAL THERAPIST

## 2024-08-01 PROCEDURE — 97140 MANUAL THERAPY 1/> REGIONS: CPT | Mod: GP | Performed by: PHYSICAL THERAPIST

## 2024-08-01 ASSESSMENT — PAIN - FUNCTIONAL ASSESSMENT: PAIN_FUNCTIONAL_ASSESSMENT: 0-10

## 2024-08-01 ASSESSMENT — PAIN SCALES - GENERAL: PAINLEVEL_OUTOF10: 1

## 2024-08-01 NOTE — PROGRESS NOTES
Physical Therapy Treatment    Patient Name: Yumi Richard  MRN: [id number]  Today's Date: 8/1/2024  Time Calculation  Start Time: 0945  Stop Time: 1029  Time Calculation (min): 44 min  PT Therapeutic Procedures Time Entry  Manual Therapy Time Entry: 20  Therapeutic Exercise Time Entry: 23,      Current Problem  1. Right patellofemoral syndrome  Follow Up In Physical Therapy            Insurance:  Payor:  EMPLOYEE MEDICAL PLAN / Plan:  EMPLOYEE MEDICAL PLAN TRADITIONAL  / Product Type: *No Product type* /   Number of Treatments Authorized: 23/30          Subjective   General  Reason for Referral: R knee pain  Referred By: Mary Ellen Hale MD  Past Medical History Relevant to Rehab: DN Consent signed 5/20/2024 (Reviewed pt past medical history - RLM)  General Comment: Patient states that she was able to sprint down the brush the other day. Notes that she has had much less pain in her knee with walking and driving.    Performing HEP?: Yes    Precautions  Precautions  STEADI Fall Risk Score (The score of 4 or more indicates an increased risk of falling): 0  Precautions Comment: None  Pain  Pain Assessment: 0-10  0-10 (Numeric) Pain Score: 1    Objective   Minimal TTP along proximal patellar tendon    Treatments:    Therapeutic Exercise  Therapeutic Exercise Activity 1: Sportsarc lvl 3 x 6 min  Therapeutic Exercise Activity 2: dynamic: knee hug, quad pull, open/close gait, ankle swipe, side lunge  Therapeutic Exercise Activity 3: Total gym lvl 7 quick down, pause, quick up x 10, SL 4 x 5 ea  Therapeutic Exercise Activity 4: Marching with suitcase carry 20# 3 x 30 ft ea direction         Manual Therapy  Manual Therapy Activity 1: STM: R quadriceps  Manual Therapy Activity 2: Grade 3 patellar mobilization superior      Assessment:  PT Assessment  PT Assessment Results: Decreased strength, Decreased range of motion, Impaired balance, Decreased mobility, Pain  Assessment Comment: Patient able to tolerate increased  pacing of exercises for greater force production of quadriceps without any increase in lasting pain.  Slight tenderness remains along patellar tendon though greater force required to elicit any pain response.  Overall progressing well and educated patient on continuing home program and calling with any questions.  If patient does not return in 30 days patient will be discharged at that time.    Plan:  OP PT Plan  Treatment/Interventions: Dry needling, Education/ Instruction, Electrical stimulation, Manual therapy, Neuromuscular re-education, Self care/ home management, Therapeutic exercises, Taping techniques, Therapeutic activities  PT Plan: Skilled PT (LE strengthening, Manual PRN, progressive over loading)  PT Frequency: 2 times per week  Duration: 10 weeks  Onset Date: 02/01/24  Number of Treatments Authorized: 23/30  Rehab Potential: Good  Plan of Care Agreement: Patient    Goals:  Active       PT Problem       PT Goal 1       Start:  04/01/24    Expected End:  09/20/24       STG  1) Patient will be able to complete all normal activities with pain no greater than 1 /10 in 6 weeks.- partially achieved  2) Patient will be able to perform proper squatting technique in 6 weeks in order to reduce compression on knee and prevent increased pain with daily tasks. - goal met  3) Patient will be independent with HEP in 3 visits to allow for continued improvement in daily tasks at home and in the community. - goal met    LTG  1) Patient will improve LEFS to 71/80 in order to allow for greater completion of functional activities at home and in the community in 10 weeks. - in progress  2) Patient will have 5-/5 strength in lateral hip stabilizers to prevent any descending compensations required for proper gait mechanics in 10 weeks. - goal met  3) Patient will be able to perform >30 seconds of right SLS on multiple surfaces in order to allow for safe ambulation on all levels within the community in 6 weeks. - goal met            Patient Stated Goal 1       Start:  04/01/24    Expected End:  09/20/24       Strengthen R leg and lessen the pain - in progress              Jose Saleem, PT

## 2024-09-25 ENCOUNTER — TELEPHONE (OUTPATIENT)
Dept: PRIMARY CARE | Facility: CLINIC | Age: 34
End: 2024-09-25

## 2024-09-25 ENCOUNTER — APPOINTMENT (OUTPATIENT)
Dept: PRIMARY CARE | Facility: CLINIC | Age: 34
End: 2024-09-25
Payer: COMMERCIAL

## 2024-09-25 VITALS
BODY MASS INDEX: 20.24 KG/M2 | HEART RATE: 80 BPM | HEIGHT: 62 IN | SYSTOLIC BLOOD PRESSURE: 117 MMHG | TEMPERATURE: 98.2 F | WEIGHT: 110 LBS | DIASTOLIC BLOOD PRESSURE: 81 MMHG | OXYGEN SATURATION: 98 %

## 2024-09-25 DIAGNOSIS — F32.0 DEPRESSION, MAJOR, SINGLE EPISODE, MILD (CMS-HCC): Primary | ICD-10-CM

## 2024-09-25 DIAGNOSIS — F98.8 ATTENTION DEFICIT DISORDER (ADD) WITHOUT HYPERACTIVITY: ICD-10-CM

## 2024-09-25 PROBLEM — N87.0 CIN I (CERVICAL INTRAEPITHELIAL NEOPLASIA I): Status: RESOLVED | Noted: 2023-06-01 | Resolved: 2024-09-25

## 2024-09-25 PROBLEM — M25.561 KNEE PAIN, RIGHT: Status: RESOLVED | Noted: 2023-06-01 | Resolved: 2024-09-25

## 2024-09-25 PROBLEM — D06.9 CIN III (CERVICAL INTRAEPITHELIAL NEOPLASIA III): Status: RESOLVED | Noted: 2023-06-01 | Resolved: 2024-09-25

## 2024-09-25 PROBLEM — L70.9 ACNE: Status: RESOLVED | Noted: 2023-06-01 | Resolved: 2024-09-25

## 2024-09-25 PROBLEM — N87.1 CIN II (CERVICAL INTRAEPITHELIAL NEOPLASIA II): Status: RESOLVED | Noted: 2023-06-01 | Resolved: 2024-09-25

## 2024-09-25 PROBLEM — R87.810 ASCUS WITH POSITIVE HIGH RISK HPV CERVICAL: Status: RESOLVED | Noted: 2023-06-01 | Resolved: 2024-09-25

## 2024-09-25 PROBLEM — R87.610 ASCUS WITH POSITIVE HIGH RISK HPV CERVICAL: Status: RESOLVED | Noted: 2023-06-01 | Resolved: 2024-09-25

## 2024-09-25 PROBLEM — Z86.59 HISTORY OF DEPRESSION: Status: RESOLVED | Noted: 2024-07-22 | Resolved: 2024-09-25

## 2024-09-25 PROBLEM — J39.8 STENOSIS OF TRACHEA: Status: RESOLVED | Noted: 2024-07-22 | Resolved: 2024-09-25

## 2024-09-25 PROCEDURE — 1036F TOBACCO NON-USER: CPT | Performed by: FAMILY MEDICINE

## 2024-09-25 PROCEDURE — 3008F BODY MASS INDEX DOCD: CPT | Performed by: FAMILY MEDICINE

## 2024-09-25 PROCEDURE — 99213 OFFICE O/P EST LOW 20 MIN: CPT | Performed by: FAMILY MEDICINE

## 2024-09-25 PROCEDURE — 80307 DRUG TEST PRSMV CHEM ANLYZR: CPT

## 2024-09-25 RX ORDER — DEXTROAMPHETAMINE SACCHARATE, AMPHETAMINE ASPARTATE MONOHYDRATE, DEXTROAMPHETAMINE SULFATE AND AMPHETAMINE SULFATE 2.5; 2.5; 2.5; 2.5 MG/1; MG/1; MG/1; MG/1
10 CAPSULE, EXTENDED RELEASE ORAL EVERY MORNING
Qty: 30 CAPSULE | Refills: 0 | Status: SHIPPED | OUTPATIENT
Start: 2024-09-25 | End: 2024-09-25

## 2024-09-25 RX ORDER — IBUPROFEN 100 MG/5ML
1000 SUSPENSION, ORAL (FINAL DOSE FORM) ORAL DAILY
COMMUNITY

## 2024-09-25 RX ORDER — DEXTROAMPHETAMINE SACCHARATE, AMPHETAMINE ASPARTATE MONOHYDRATE, DEXTROAMPHETAMINE SULFATE AND AMPHETAMINE SULFATE 1.25; 1.25; 1.25; 1.25 MG/1; MG/1; MG/1; MG/1
10 CAPSULE, EXTENDED RELEASE ORAL EVERY MORNING
Qty: 60 CAPSULE | Refills: 0 | Status: SHIPPED | OUTPATIENT
Start: 2024-09-25 | End: 2024-10-25

## 2024-09-25 ASSESSMENT — PATIENT HEALTH QUESTIONNAIRE - PHQ9
1. LITTLE INTEREST OR PLEASURE IN DOING THINGS: NOT AT ALL
2. FEELING DOWN, DEPRESSED OR HOPELESS: NOT AT ALL
SUM OF ALL RESPONSES TO PHQ9 QUESTIONS 1 AND 2: 0
SUM OF ALL RESPONSES TO PHQ9 QUESTIONS 1 AND 2: 0
1. LITTLE INTEREST OR PLEASURE IN DOING THINGS: NOT AT ALL
2. FEELING DOWN, DEPRESSED OR HOPELESS: NOT AT ALL

## 2024-09-25 NOTE — PROGRESS NOTES
"Subjective   Patient ID: Yumi Richard is a 34 y.o. female who presents for Follow-up. Feels the Strattera is not helping at all; still unable to get motivated.         Mood is good   Taking Wellbutrin     Lack of focus   Going to work  Lack of motivation and focus     Job is going ok      Stimulants in the past with some relief          Review of Systems    Objective   /81   Pulse 80   Temp 36.8 °C (98.2 °F)   Ht 1.575 m (5' 2\")   Wt 49.9 kg (110 lb)   SpO2 98%   BMI 20.12 kg/m²     Physical Exam  Constitutional:       Appearance: Normal appearance. She is well-developed.   Cardiovascular:      Rate and Rhythm: Normal rate and regular rhythm.      Heart sounds: Normal heart sounds. No murmur heard.  Pulmonary:      Effort: Pulmonary effort is normal.      Breath sounds: Normal breath sounds.   Neurological:      General: No focal deficit present.      Mental Status: She is alert.   Psychiatric:         Mood and Affect: Mood normal.         Behavior: Behavior normal.         Assessment/Plan   Problem List Items Addressed This Visit             ICD-10-CM    ADD (attention deficit disorder) F98.8    Relevant Medications    amphetamine-dextroamphetamine XR (Adderall XR) 5 mg 24 hr capsule    Other Relevant Orders    Drug Screen, Urine With Reflex to Confirmation    Depression, major, single episode, mild (CMS-HCC) - Primary F32.0          "

## 2024-09-25 NOTE — PATIENT INSTRUCTIONS
Trial stimulant     Let me know if you need a higher dose   Discussed pros and cons of the patient, side effects, benefits    Attention deficit disorder: ADD is a condition that causes a person to have difficulty with focusing.  Behaviorally, you can do things such as keeping a regular schedule, trying to stay on task by writing down your plan and schedule.  Minimizing distractions when you are trying to accomplish a task such as studying or working is helpful with ADD.  Also, when you're trying to focus taking regular scheduled breaks is helpful.  If you are on medications for ADD be aware of side effects such as diminished appetite, or trouble sleeping.  If you have problems with your medications please let your doctor know.  For ADD: It is important that you get regular exercise.  It is important to get regular sleep, let your doctor know if you have worsening sleep issues.  Also, eat regular meals and notify us of any significant changes in your appetite.  .  If you are on medication for ADD you should be seen in the office every 3-4 months for recheck on your medications.      Follow-up in 3 months

## 2024-09-25 NOTE — TELEPHONE ENCOUNTER
Pt was seen in office today 9/25/2024, was prescribed Adderal 10 mg. Pharmacy is back ordered on the 10 mg, so is asking 5 mg be sent in.    [facility] Beltrami Abdoulaye

## 2024-09-26 LAB
AMPHETAMINES UR QL SCN: NORMAL
BARBITURATES UR QL SCN: NORMAL
BENZODIAZ UR QL SCN: NORMAL
BZE UR QL SCN: NORMAL
CANNABINOIDS UR QL SCN: NORMAL
FENTANYL+NORFENTANYL UR QL SCN: NORMAL
METHADONE UR QL SCN: NORMAL
OPIATES UR QL SCN: NORMAL
OXYCODONE+OXYMORPHONE UR QL SCN: NORMAL
PCP UR QL SCN: NORMAL

## 2024-10-24 ENCOUNTER — TELEPHONE (OUTPATIENT)
Dept: PRIMARY CARE | Facility: CLINIC | Age: 34
End: 2024-10-24
Payer: COMMERCIAL

## 2024-10-24 NOTE — TELEPHONE ENCOUNTER
Patient has an appointment for massage through [facility] for chronic headaches and is asking for a referral for it to be covered by insurance.     Apt is scheduled for 11/08

## 2024-10-25 NOTE — TELEPHONE ENCOUNTER
MD Sho Cast MA  Caller: Unspecified (Yesterday, 12:59 PM)  I cannot put in referrals for stuff I have not addressed, she wants a referral she needs to see me          Previous Messages    Referral  (Newest Message First)  View All Conversations on this Encounter  Mary Ellen Hale MD  You2 hours ago (11:08 AM)       I cannot put in referrals for stuff I have not addressed, she wants a referral she needs to see me     You routed conversation to Mary Ellen Hale MD23 hours ago (1:29 PM)     Elaina Lora routed conversation to Do [facility] [facility] Clinical Support StaffYesterday (1:00 PM)     Elaina LoraYesterday (1:00 PM)     SG  Patient has an appointment for massage through [facility] for chronic headaches and is asking for a referral for it to be covered by insurance.      Apt is scheduled for 11/08            Note        Yumi Richard [phone]  Elaina LoraYesterday (12:59 PM)   Pt informed. [place]

## 2024-10-29 ENCOUNTER — APPOINTMENT (OUTPATIENT)
Dept: PRIMARY CARE | Facility: CLINIC | Age: 34
End: 2024-10-29
Payer: COMMERCIAL

## 2024-10-29 ENCOUNTER — HOSPITAL ENCOUNTER (OUTPATIENT)
Dept: RADIOLOGY | Facility: CLINIC | Age: 34
Discharge: HOME | End: 2024-10-29
Payer: COMMERCIAL

## 2024-10-29 VITALS
HEIGHT: 62 IN | SYSTOLIC BLOOD PRESSURE: 120 MMHG | BODY MASS INDEX: 20.43 KG/M2 | OXYGEN SATURATION: 99 % | HEART RATE: 85 BPM | WEIGHT: 111 LBS | DIASTOLIC BLOOD PRESSURE: 82 MMHG

## 2024-10-29 DIAGNOSIS — G44.86 CERVICOGENIC HEADACHE: Primary | ICD-10-CM

## 2024-10-29 DIAGNOSIS — M54.2 NECK PAIN: ICD-10-CM

## 2024-10-29 DIAGNOSIS — G44.86 CERVICOGENIC HEADACHE: ICD-10-CM

## 2024-10-29 DIAGNOSIS — G43.709 CHRONIC MIGRAINE WITHOUT AURA WITHOUT STATUS MIGRAINOSUS, NOT INTRACTABLE: ICD-10-CM

## 2024-10-29 PROCEDURE — 99214 OFFICE O/P EST MOD 30 MIN: CPT | Performed by: FAMILY MEDICINE

## 2024-10-29 PROCEDURE — 72050 X-RAY EXAM NECK SPINE 4/5VWS: CPT | Performed by: RADIOLOGY

## 2024-10-29 PROCEDURE — 3008F BODY MASS INDEX DOCD: CPT | Performed by: FAMILY MEDICINE

## 2024-10-29 PROCEDURE — 72050 X-RAY EXAM NECK SPINE 4/5VWS: CPT

## 2024-10-29 PROCEDURE — 1036F TOBACCO NON-USER: CPT | Performed by: FAMILY MEDICINE

## 2024-10-29 RX ORDER — TOPIRAMATE 25 MG/1
25 TABLET ORAL DAILY
Qty: 30 TABLET | Refills: 2 | Status: SHIPPED | OUTPATIENT
Start: 2024-10-29 | End: 2025-01-27

## 2024-10-29 ASSESSMENT — ENCOUNTER SYMPTOMS
PHOTOPHOBIA: 1
VOMITING: 0
NAUSEA: 1

## 2024-11-08 ENCOUNTER — APPOINTMENT (OUTPATIENT)
Dept: INTEGRATIVE MEDICINE | Facility: CLINIC | Age: 34
End: 2024-11-08
Payer: COMMERCIAL

## 2024-11-08 PROCEDURE — MASS60E MASSAGE 60 MINUTES EMPLOYEE RATE

## 2024-11-08 SDOH — SOCIAL STABILITY: SOCIAL NETWORK: I HAVE TROUBLE DOING ALL OF MY USUAL WORK (INCLUDE WORK AT HOME): NEVER

## 2024-11-08 SDOH — SOCIAL STABILITY: SOCIAL NETWORK: I HAVE TROUBLE DOING ALL OF THE FAMILY ACTIVITIES THAT I WANT TO DO: NEVER

## 2024-11-08 SDOH — SOCIAL STABILITY: SOCIAL NETWORK: I HAVE TROUBLE DOING ALL OF THE ACTIVITIES WITH FRIENDS THAT I WANT TO DO: NEVER

## 2024-11-08 SDOH — ECONOMIC STABILITY: FOOD INSECURITY: WITHIN THE PAST 12 MONTHS, YOU WORRIED THAT YOUR FOOD WOULD RUN OUT BEFORE YOU GOT MONEY TO BUY MORE.: NEVER TRUE

## 2024-11-08 SDOH — SOCIAL STABILITY: SOCIAL NETWORK: PROMIS ABILITY TO PARTICIPATE IN SOCIAL ROLES & ACTIVITIES T-SCORE: 64

## 2024-11-08 SDOH — ECONOMIC STABILITY: FOOD INSECURITY: WITHIN THE PAST 12 MONTHS, THE FOOD YOU BOUGHT JUST DIDN'T LAST AND YOU DIDN'T HAVE MONEY TO GET MORE.: NEVER TRUE

## 2024-11-08 SDOH — SOCIAL STABILITY: SOCIAL NETWORK: I HAVE TROUBLE DOING ALL OF MY REGULAR LEISURE ACTIVITIES WITH OTHERS: NEVER

## 2024-11-08 SDOH — SOCIAL STABILITY: SOCIAL NETWORK

## 2024-11-08 ASSESSMENT — PROMIS GLOBAL HEALTH SCALE
CARRYOUT_SOCIAL_ACTIVITIES: VERY GOOD
RATE_GENERAL_HEALTH: VERY GOOD
RATE_AVERAGE_PAIN: 5
CARRYOUT_PHYSICAL_ACTIVITIES: COMPLETELY
EMOTIONAL_PROBLEMS: SOMETIMES
RATE_MENTAL_HEALTH: GOOD
RATE_QUALITY_OF_LIFE: EXCELLENT
RATE_SOCIAL_SATISFACTION: VERY GOOD
RATE_AVERAGE_FATIGUE: MILD
RATE_PHYSICAL_HEALTH: VERY GOOD

## 2024-11-08 NOTE — PROGRESS NOTES
Massage Therapy Visit:     Yumi Richard     Condition of Client Subjective :  Patient ID: Yumi Richard is a 34 y.o. female who presents for reason for visit of   Bilateral neck and shoulder tension creating discomfort in ROM and contributing to headaches/migraines   Migraines and tension headaches that start in neck and around eyes   Bilateral tension throughout back from thoracic into lumbar spine  Is a labor and delivery nurse in [employer]   Looking for deeper pressure    50 min. Moderate pressure upper body massage, MFR throughout scalp/face, palpations knuckle strokes stripping pin/stretch cross fiber frictions and ischemic pressure throughout scalp face upper traps levator scapulas scalenes omohyoids sternocleidomastoids supraspinatus  occipital attachments and splenis capitis/cervicis, pétrissage stripping knuckle strokes cross fiber frictions and compressions throughout trapezius rhomboids erector spinae infraspinatus quadratus lumborum latissimus dorsi obliques         Session Information  Visit Type: New patient  Description of present complaint: Stress, Headache, Discomfort, Muscle tension, Range of motion (ROM), Myofascial pain, Chronic pain, Gait issues    Before providing massage therapy, I discussed the provision of massage therapy services and any pertinent issues related to the patient's status with the patient's nurse. I implemented fall prevention measures including handrails, nonskid socks, and having a call light within reach. Following massage therapy, I provided a report to the patient's nurse.    Review of Systems    Objective   Pre-treatment Assessment  Arrival Mode: Ambulatory  Treatment Precautions: None    Physical Exam    Actions Assessment/Plan :  Provider reviewed plan for the massage session, precautions and contraindications. Patient/guardian/hospital staff has given consent to treat with full understanding of what to expect during the session. Before massage therapy began,  provider explained to the patient to communicate at any time if the pressure was causing discomfort past their tolerance level. Patient agreed to advise therapist.    Massage Treatment  Denies Allergy: Patient denies allergy to topical lubricant  Patient Position: Table, Prone, Supine  Positioning Assistance: Did not need assistance, Pillow(s)/bolster under knees while supine, Pillow(s)/bolster under anles while prone  Pressure Scale: 4 - Moderate pressure    Response:  Post-treatment Assessment  Patient Fell Asleep During Treatment: No  Patient Noted Improvement of the Following Symptoms: Muscle tension, ROM    Evaluation:

## 2024-11-12 ENCOUNTER — APPOINTMENT (OUTPATIENT)
Dept: INTEGRATIVE MEDICINE | Facility: CLINIC | Age: 34
End: 2024-11-12
Payer: COMMERCIAL

## 2024-12-10 SDOH — ECONOMIC STABILITY: FOOD INSECURITY: WITHIN THE PAST 12 MONTHS, THE FOOD YOU BOUGHT JUST DIDN'T LAST AND YOU DIDN'T HAVE MONEY TO GET MORE.: NEVER TRUE

## 2024-12-10 SDOH — SOCIAL STABILITY: SOCIAL NETWORK: I HAVE TROUBLE DOING ALL OF MY USUAL WORK (INCLUDE WORK AT HOME): SOMETIMES

## 2024-12-10 SDOH — SOCIAL STABILITY: SOCIAL NETWORK: PROMIS ABILITY TO PARTICIPATE IN SOCIAL ROLES & ACTIVITIES T-SCORE: 56

## 2024-12-10 SDOH — SOCIAL STABILITY: SOCIAL NETWORK

## 2024-12-10 SDOH — ECONOMIC STABILITY: FOOD INSECURITY: WITHIN THE PAST 12 MONTHS, YOU WORRIED THAT YOUR FOOD WOULD RUN OUT BEFORE YOU GOT MONEY TO BUY MORE.: NEVER TRUE

## 2024-12-10 SDOH — SOCIAL STABILITY: SOCIAL NETWORK: I HAVE TROUBLE DOING ALL OF MY REGULAR LEISURE ACTIVITIES WITH OTHERS: NEVER

## 2024-12-10 SDOH — SOCIAL STABILITY: SOCIAL NETWORK: I HAVE TROUBLE DOING ALL OF THE FAMILY ACTIVITIES THAT I WANT TO DO: NEVER

## 2024-12-10 SDOH — SOCIAL STABILITY: SOCIAL NETWORK: I HAVE TROUBLE DOING ALL OF THE ACTIVITIES WITH FRIENDS THAT I WANT TO DO: NEVER

## 2024-12-10 ASSESSMENT — PROMIS GLOBAL HEALTH SCALE
CARRYOUT_SOCIAL_ACTIVITIES: VERY GOOD
RATE_AVERAGE_PAIN: 3
RATE_AVERAGE_FATIGUE: MILD
RATE_MENTAL_HEALTH: GOOD
RATE_GENERAL_HEALTH: VERY GOOD
CARRYOUT_PHYSICAL_ACTIVITIES: COMPLETELY
RATE_PHYSICAL_HEALTH: VERY GOOD
RATE_QUALITY_OF_LIFE: VERY GOOD
EMOTIONAL_PROBLEMS: RARELY
RATE_SOCIAL_SATISFACTION: VERY GOOD

## 2024-12-10 ASSESSMENT — ANXIETY QUESTIONNAIRES
PAST MONTH HOW OFTEN HAVE YOU FELT THAT YOU WERE UNABLE TO CONTROL THE IMPORTANT THINGS IN YOUR LIFE: 0 - NEVER
PAST MONTH HOW OFTEN HAVE YOU FELT THAT YOU WERE UNABLE TO CONTROL THE IMPORTANT THINGS IN YOUR LIFE: 0 - NEVER
PAST MONTH HOW OFTEN HAVE YOU FELT THAT THINGS WERE GOING YOUR WAY: 3 - FAIRLY OFTEN
PAST MONTH HOW OFTEN HAVE YOU FELT CONFIDENT ABOUT YOUR ABILITY TO HANDLE YOUR PROBLEMS: 3 - FAIRLY OFTEN
PAST MONTH HOW OFTEN HAVE YOU FELT DIFFICULTIES WERE PILING UP SO HIGH THAT YOU COULD NOT OVERCOME THEM: 2 - SOMETIMES
PAST MONTH HOW OFTEN HAVE YOU FELT CONFIDENT ABOUT YOUR ABILITY TO HANDLE YOUR PROBLEMS: 3 - FAIRLY OFTEN

## 2024-12-11 ENCOUNTER — APPOINTMENT (OUTPATIENT)
Dept: INTEGRATIVE MEDICINE | Facility: CLINIC | Age: 34
End: 2024-12-11
Payer: COMMERCIAL

## 2024-12-11 DIAGNOSIS — M79.9 POSTURAL STRAIN: ICD-10-CM

## 2024-12-11 DIAGNOSIS — M99.03 SEGMENTAL AND SOMATIC DYSFUNCTION OF LUMBAR REGION: ICD-10-CM

## 2024-12-11 DIAGNOSIS — M99.01 SEGMENTAL AND SOMATIC DYSFUNCTION OF CERVICAL REGION: Primary | ICD-10-CM

## 2024-12-11 DIAGNOSIS — M99.04 SEGMENTAL AND SOMATIC DYSFUNCTION OF SACRAL REGION: ICD-10-CM

## 2024-12-11 DIAGNOSIS — M79.10 MYALGIA: ICD-10-CM

## 2024-12-11 DIAGNOSIS — M54.2 CERVICALGIA: ICD-10-CM

## 2024-12-11 DIAGNOSIS — G44.86 CERVICOGENIC HEADACHE: ICD-10-CM

## 2024-12-11 DIAGNOSIS — M54.59 MECHANICAL LOW BACK PAIN: ICD-10-CM

## 2024-12-11 DIAGNOSIS — M99.02 SEGMENTAL AND SOMATIC DYSFUNCTION OF THORACIC REGION: ICD-10-CM

## 2024-12-11 PROCEDURE — 98941 CHIROPRACT MANJ 3-4 REGIONS: CPT | Performed by: CHIROPRACTOR

## 2024-12-11 PROCEDURE — 99203 OFFICE O/P NEW LOW 30 MIN: CPT | Performed by: CHIROPRACTOR

## 2024-12-11 ASSESSMENT — ENCOUNTER SYMPTOMS
NECK PAIN: 1
MYALGIAS: 1
JOINT SWELLING: 0
UNEXPECTED WEIGHT CHANGE: 0
TROUBLE SWALLOWING: 0
VOMITING: 0
NUMBNESS: 0
SHORTNESS OF BREATH: 0
DIFFICULTY URINATING: 0
HEADACHES: 1
FEVER: 0
BACK PAIN: 1
ARTHRALGIAS: 0
FATIGUE: 0
LIGHT-HEADEDNESS: 0
FLANK PAIN: 0
CHILLS: 0
WEAKNESS: 0
NECK STIFFNESS: 1
CHEST TIGHTNESS: 0
DIZZINESS: 0

## 2024-12-11 NOTE — PROGRESS NOTES
Subjective   Patient ID: Yumi Richard is a 34 y.o. female who presents today, December 11, 2024 for a new patient evaluation and treatment of neck pain, headaches, and low back pain.    (1/20) [facility]    Chiropractic Medicine HPI:  Provacative factors include : bending, lifting, looking down, stress  Palliative factors incude : heat, ice, medications, stretching, massage, reclined position  Pain is described as : ache, dull   Previous treatment for complaint has included : heat, ice, stretching, NSAIDS, Rx medications, Physical Therapy, Massage Therapy  Patient denies : trauma/accidents/injuries/falls (last 6 months), numbness/tingling, bladder weakness, bowel weakness, difficulty walking, instability, radiating pain into the distal extremity, catching, clicking, grinding, popping  Patient rates severity of pain at : 3/10 on a numerical pain scale  Patient rates least severe pain at : 1/10 on a numerical pain scale  Patient rates most severe pain at : 8/10 on a numerical pain scale   Completed Oswestry Disability Index prior to visit: yes     Yumi presents for evaluation and treatment of neck pain, headaches and low back pain. Patient states that she has a long history of headaches. She states that she gets migraines, tension type headaches and sinus headaches. She states that migraines have recently been helped with topamax and have been reduced to twice a month. She states that tension type headaches are more frequent, 4-5 times a week. She states that neck pain starts at the base of the skull and goes down to the top of the shoulder, R>L. She states that screen time, looking down, and stress increase symptoms while ibuprofen, heat/ice, massage, stretching help reduce symptoms. She states that low back pain began about 2 months ago without incident. She states that pain goes across the low back and into the buttock, L>R. She states that she occasionally has a pinching sensation in her left glute while walking.  She states that bending and lifting increase symptoms, while heat and rest help decrease symptoms. Patient denies any  difficulty speaking/swallowing, vision changes, bowel/bladder issues, chest pain/shortness of breath, numbness/tingling. Patient is new to chiropractic care.            Objective   Review of Systems   Constitutional:  Negative for chills, fatigue, fever and unexpected weight change.   HENT:  Negative for trouble swallowing.    Eyes:  Negative for visual disturbance.   Respiratory:  Negative for chest tightness and shortness of breath.    Cardiovascular:  Negative for chest pain and leg swelling.   Gastrointestinal:  Negative for vomiting.   Genitourinary:  Negative for difficulty urinating and flank pain.   Musculoskeletal:  Positive for back pain, myalgias, neck pain and neck stiffness. Negative for arthralgias, gait problem and joint swelling.   Neurological:  Positive for headaches. Negative for dizziness, weakness, light-headedness and numbness.   Psychiatric/Behavioral:  Negative for self-injury and suicidal ideas.    All other systems reviewed and are negative.    Physical Exam  Constitutional:       General: She is not in acute distress.     Appearance: Normal appearance.       HENT:      Head: Normocephalic and atraumatic.   Musculoskeletal:      Cervical back: Tenderness present. Pain with movement present. Decreased range of motion.      Thoracic back: Tenderness present.      Lumbar back: Tenderness present. Decreased range of motion.   Neurological:      General: No focal deficit present.      Mental Status: She is alert and oriented to person, place, and time.      Cranial Nerves: No dysarthria or facial asymmetry.      Sensory: Sensation is intact.      Motor: Motor function is intact.      Coordination: Coordination is intact.      Gait: Gait is intact.   Psychiatric:         Attention and Perception: Attention normal.         Mood and Affect: Mood normal.         Speech: Speech  normal.         Behavior: Behavior is cooperative.        Spine Musculoskeletal Exam    Gait    Gait is normal.    Inspection    Cervical Spine    Cervical spine inspection additional comments: Mild forward head carriage and bilateral rounded shoulders.    Palpation    Cervical Spine    Tenderness: present      Paraspinous: right and left      Trapezius: right and left      Periscapular: right and left      Suboccipital triangle: right and left    Right      Muscle tone: increased    Left      Muscle tone: increased    Thoracolumbar    Tenderness: present      Paraspinous: right and left      Gluteal: right and left    Right      Muscle tone: increased    Left      Muscle tone: increased    Range of Motion    Cervical Spine       Cervical flexion: chin to chest. Cervical flexion detail: pain.     Cervical extension: normal. Cervical extension detail: no pain.       Right      Lateral bending: reduced bend (0-25%). Lateral bending detail: no pain.       Lateral rotation: reduced rotation (0-25%). Lateral rotation detail: pain.       Left      Lateral bending: reduced bend (0-25%). Lateral bending detail: pain.       Lateral rotation: reduced rotation (0-25%). Lateral rotation detail: no pain.      Thoracolumbar       Flexion: normal. Flexion detail: no pain.     Extension: 75%. Extension detail: pain.       Right      Lateral bending: normal. Lateral bending detail: no pain.       Lateral rotation: normal. Lateral rotation detail: no pain.       Left      Lateral bending: normal. Lateral bending detail: no pain.       Lateral rotation: normal. Lateral rotation detail: no pain.      Strength    Cervical Spine    Cervical spine motor exam is normal.    Thoracolumbar    Thoracolumbar motor exam is normal.       Sensory    Cervical Spine    Cervical spine sensation is normal.    Thoracolumbar    Thoracolumbar sensation is normal.    Special Tests    Cervical Spine    Cervical distraction - neck: decreased  pain    General    Neurological: alert       Orthopedic Tests:  Cervical: Maximum compression Bilateral and Negative.  Thoracic/Lumbar/Sacrum: Lumbosacral Benz's  Bilateral and Negative.    Segmental Joint(s): Segmental joint dysfunction was assessed with motion palpation and is identified in the following areas:  Cervical : C0 C1 C5 C6 C7  Thoracic : T2., T3, T6, T7, and T10  Lumbopelvic / Sacral SIJ : L1 and L5/S1  Upper Extremities : R Glenohumeral joint and L Glenohumeral joint  Lower Extremities : R Hip and L Hip    Assessment/Plan   Today's Treatment Included: Spinal manipulation to the following regions of segmental dysfunction identified on examination, using age-appropriate and injury specific force. Segmental Joint(s) Cervical : C0 C1 C5 C6 C7 Segmental Joint(s) Thoracic : T2., T3, T6, T7, and T10 Segmental Joint(s) Lumbopelvic/Sacral SIJ : L1 and L5/S1  Extremity manipulation performed to the following regions: Upper Extremities : R Glenohumeral joint and L Glenohumeral joint Lower Extremities : R Hip and L Hip  Chiropractic manipulation treatment techniques utilized: Diversified CMT, Cervical Manual Traction, and Long-Axis Traction   Soft tissue mobilization techniques utilized during treatment: Ischemic compression    Suboccipital bilateral, Cervical paraspinal mm. bilateral, Scalenes right, Upper Trapezius bilateral, and Levator Scap. bilateral  Thoracic Paraspinal mm. bilateral, Middle Trapezius bilateral, and Pectoralis bilateral  Lumbar Paraspinal mm. bilateral and Gluteal mm.  bilateral    Treatment Plan: The patient and I discussed the risks and benefits of Chiropractic Care.  Consent for care was given both written and orally by the patient.  Based on the patient's subjective complaints along with the examination findings, it is advised that a course of Chiropractic Treatment by initiated in the form of:   Chiropractic Manipulation (CMT)  Neuro-Muscular Re-education  Integrative Dry Needing  (IDN)  Chiropractic treatment recommended at a frequency of 1 times per week for 4 weeks  until symptoms are mild or manageable  The goals of the treatment will be to: decrease pain, improve quality of life, improve the ability to stand, decrease muscular hypertonicity, and increase functional capacity  The patient tolerated today's treatment with little or no additional discomfort and was instructed to contact the office for questions or concerns.   Follow up as scheduled.

## 2024-12-27 ENCOUNTER — APPOINTMENT (OUTPATIENT)
Dept: INTEGRATIVE MEDICINE | Facility: CLINIC | Age: 34
End: 2024-12-27
Payer: COMMERCIAL

## 2024-12-27 PROCEDURE — MASS60E MASSAGE 60 MINUTES EMPLOYEE RATE

## 2024-12-27 NOTE — PROGRESS NOTES
Massage Therapy Visit:     Yumi Richard     Condition of Client Subjective :  Patient ID: Yumi Richard is a 34 y.o. female who presents for reason for visit of   Bilateral neck and shoulder tension creating discomfort in ROM and contributing to headaches/migraines   Migraines and tension headaches that start in neck and around eyes   Bilateral tension throughout back from thoracic into lumbar spine  Is a labor and delivery nurse in [employer]   Looking for deeper pressure  Bilateral pectoral tension     50 min. Moderate pressure upper body massage, MFR throughout scalp/face, palpations knuckle strokes stripping pin/stretch cross fiber frictions and ischemic pressure throughout scalp face upper traps levator scapulas scalenes omohyoids sternocleidomastoids supraspinatus  occipital attachments and splenis capitis/cervicis, pin/breath knuckle strokes throughout pectoralis major/minor,pétrissage stripping knuckle strokes cross fiber frictions and compressions throughout trapezius rhomboids erector spinae infraspinatus quadratus lumborum latissimus dorsi obliques         Session Information  Visit Type: Follow-up visit  Description of present complaint: Stress, Headache, Discomfort, Muscle tension, Range of motion (ROM), Myofascial pain, Chronic pain, Gait issues, Fatigue    Before providing massage therapy, I discussed the provision of massage therapy services and any pertinent issues related to the patient's status with the patient's nurse. I implemented fall prevention measures including handrails, nonskid socks, and having a call light within reach. Following massage therapy, I provided a report to the patient's nurse.    Review of Systems    Objective   Pre-treatment Assessment  Arrival Mode: Ambulatory  Treatment Precautions: None    Physical Exam    Actions Assessment/Plan :  Provider reviewed plan for the massage session, precautions and contraindications. Patient/guardian/hospital staff has given consent to  treat with full understanding of what to expect during the session. Before massage therapy began, provider explained to the patient to communicate at any time if the pressure was causing discomfort past their tolerance level. Patient agreed to advise therapist.    Massage Treatment  Denies Allergy: Patient denies allergy to topical lubricant  Patient Position: Table, Prone, Supine  Positioning Assistance: Did not need assistance, Pillow(s)/bolster under knees while supine, Pillow(s)/bolster under anles while prone  Pressure Scale: 4 - Moderate pressure    Response:  Post-treatment Assessment  Patient Fell Asleep During Treatment: No  Patient Noted Improvement of the Following Symptoms: Muscle tension, ROM    Evaluation:

## 2025-01-08 ENCOUNTER — APPOINTMENT (OUTPATIENT)
Dept: INTEGRATIVE MEDICINE | Facility: CLINIC | Age: 35
End: 2025-01-08
Payer: COMMERCIAL

## 2025-01-08 DIAGNOSIS — M99.03 SEGMENTAL AND SOMATIC DYSFUNCTION OF LUMBAR REGION: ICD-10-CM

## 2025-01-08 DIAGNOSIS — M99.04 SEGMENTAL AND SOMATIC DYSFUNCTION OF SACRAL REGION: ICD-10-CM

## 2025-01-08 DIAGNOSIS — M54.2 CERVICALGIA: ICD-10-CM

## 2025-01-08 DIAGNOSIS — M99.02 SEGMENTAL AND SOMATIC DYSFUNCTION OF THORACIC REGION: ICD-10-CM

## 2025-01-08 DIAGNOSIS — M54.59 MECHANICAL LOW BACK PAIN: ICD-10-CM

## 2025-01-08 DIAGNOSIS — M79.9 POSTURAL STRAIN: ICD-10-CM

## 2025-01-08 DIAGNOSIS — M99.01 SEGMENTAL AND SOMATIC DYSFUNCTION OF CERVICAL REGION: Primary | ICD-10-CM

## 2025-01-08 DIAGNOSIS — G44.86 CERVICOGENIC HEADACHE: ICD-10-CM

## 2025-01-08 DIAGNOSIS — M79.10 MYALGIA: ICD-10-CM

## 2025-01-08 PROCEDURE — 98941 CHIROPRACT MANJ 3-4 REGIONS: CPT | Performed by: CHIROPRACTOR

## 2025-01-08 PROCEDURE — 97112 NEUROMUSCULAR REEDUCATION: CPT | Performed by: CHIROPRACTOR

## 2025-01-08 NOTE — PROGRESS NOTES
Subjective   Patient ID: Yumi Richard is a 34 y.o. female who presents January 8, 2025 for neck pain, headaches and low back pain.    (1/20) [facility]    Today, the patient rates their degree of pain as a 3 out of 10 on the numeric pain rating scale.     Yumi returns for continued treatment of neck pain, headaches and low back pain. Patient states that she had improvement in symptoms after last visit. She states that she continues to have less headaches but neck and low back symptoms have returned to baseline. She states that she did not note any increased soreness after last visit. Denies any other associated symptoms or change in medical history since last visit.   ____________________________________________________  Initial Exam:  Yumi presents for evaluation and treatment of neck pain, headaches and low back pain. Patient states that she has a long history of headaches. She states that she gets migraines, tension type headaches and sinus headaches. She states that migraines have recently been helped with topamax and have been reduced to twice a month. She states that tension type headaches are more frequent, 4-5 times a week. She states that neck pain starts at the base of the skull and goes down to the top of the shoulder, R>L. She states that screen time, looking down, and stress increase symptoms while ibuprofen, heat/ice, massage, stretching help reduce symptoms. She states that low back pain began about 2 months ago without incident. She states that pain goes across the low back and into the buttock, L>R. She states that she occasionally has a pinching sensation in her left glute while walking. She states that bending and lifting increase symptoms, while heat and rest help decrease symptoms. Patient denies any  difficulty speaking/swallowing, vision changes, bowel/bladder issues, chest pain/shortness of breath, numbness/tingling. Patient is new to chiropractic care.            Objective   Physical  Exam  Constitutional:       General: She is not in acute distress.     Appearance: Normal appearance.       HENT:      Head: Normocephalic and atraumatic.   Musculoskeletal:      Cervical back: Tenderness present. Pain with movement present. Decreased range of motion.      Thoracic back: Tenderness present.      Lumbar back: Tenderness present. Decreased range of motion.   Neurological:      General: No focal deficit present.      Mental Status: She is alert and oriented to person, place, and time.      Cranial Nerves: No dysarthria or facial asymmetry.      Sensory: Sensation is intact.      Motor: Motor function is intact.      Coordination: Coordination is intact.      Gait: Gait is intact.   Psychiatric:         Attention and Perception: Attention normal.         Mood and Affect: Mood normal.         Speech: Speech normal.         Behavior: Behavior is cooperative.         Palpation of the following regions revealed:  Cervical: Suboccipitals bilateral, hypertonicity and tenderness.  Upper trapezius bilateral, hypertonicity and tenderness.  Levator scapulae bilateral, hypertonicity and tenderness.  Cervical paraspinals bilateral, hypertonicity and tenderness.  Thoracic: Thoracic paraspinals bilateral, hypertonicity and tenderness.  Middle trapezius bilateral, hypertonicity and tenderness.  Pectoralis bilateral, hypertonicity and tenderness.  Lumbar: Lumbar paraspinals bilateral, hypertonicity and tenderness.  Gluteal bilateral, hypertonicity and tenderness.    Segmental Joint(s): Segmental joint dysfunction was assessed with motion palpation and is identified in the following areas:  Cervical : C0 C1 C5 C6 C7  Thoracic : T2., T3, T6, T7, and T10  Lumbopelvic / Sacral SIJ : L1 and L5/S1  Upper Extremities : R Glenohumeral joint and L Glenohumeral joint  Lower Extremities : R Hip and L Hip    Assessment/Plan   Today's Treatment Included: Spinal manipulation to the following regions of segmental dysfunction  identified on examination, using age-appropriate and injury specific force. Segmental Joint(s) Cervical : C0 C1 C5 C6 C7 Segmental Joint(s) Thoracic : T2., T3, T6, T7, and T10 Segmental Joint(s) Lumbopelvic/Sacral SIJ : L1 and L5/S1  Extremity manipulation performed to the following regions: Upper Extremities : R Glenohumeral joint and L Glenohumeral joint Lower Extremities : R Hip and L Hip  Chiropractic manipulation treatment techniques utilized: Diversified CMT, Cervical Manual Traction, and Long-Axis Traction   Soft tissue mobilization techniques utilized during treatment: Pin and Stretch, Cupping, and Ischemic compression  Neuromuscular Re-Education (58528): 2 Units; Start time: 9:45 am  End time: 10:10 am      Soft-tissue mobilization was performed in the following areas:  Suboccipital bilateral, Cervical paraspinal mm. bilateral, Upper Trapezius bilateral, and Levator Scap. bilateral  Thoracic Paraspinal mm. bilateral, Middle Trapezius bilateral, and Pectoralis bilateral  Lumbar Paraspinal mm. bilateral and Gluteal mm.  bilateral    Recommended follow-up in 1 week(s).     The patient tolerated today's treatment with little or no additional discomfort and was instructed to contact the office for questions or concerns.

## 2025-01-16 ENCOUNTER — APPOINTMENT (OUTPATIENT)
Dept: INTEGRATIVE MEDICINE | Facility: CLINIC | Age: 35
End: 2025-01-16
Payer: COMMERCIAL

## 2025-01-16 DIAGNOSIS — M99.04 SEGMENTAL AND SOMATIC DYSFUNCTION OF SACRAL REGION: ICD-10-CM

## 2025-01-16 DIAGNOSIS — G44.86 CERVICOGENIC HEADACHE: ICD-10-CM

## 2025-01-16 DIAGNOSIS — M99.03 SEGMENTAL AND SOMATIC DYSFUNCTION OF LUMBAR REGION: ICD-10-CM

## 2025-01-16 DIAGNOSIS — M99.02 SEGMENTAL AND SOMATIC DYSFUNCTION OF THORACIC REGION: ICD-10-CM

## 2025-01-16 DIAGNOSIS — M79.9 POSTURAL STRAIN: ICD-10-CM

## 2025-01-16 DIAGNOSIS — M79.10 MYALGIA: ICD-10-CM

## 2025-01-16 DIAGNOSIS — M54.59 MECHANICAL LOW BACK PAIN: ICD-10-CM

## 2025-01-16 DIAGNOSIS — M99.01 SEGMENTAL AND SOMATIC DYSFUNCTION OF CERVICAL REGION: Primary | ICD-10-CM

## 2025-01-16 DIAGNOSIS — M54.2 CERVICALGIA: ICD-10-CM

## 2025-01-16 NOTE — PROGRESS NOTES
Subjective   Patient ID: Yumi Richard is a 34 y.o. female who presents January 16, 2025 for neck pain, headaches and low back pain.    (2/20) [facility]    Today, the patient rates their degree of pain as a 2 out of 10 on the numeric pain rating scale.     Yumi returns for continued treatment of neck pain, headaches and low back pain. Patient states that she felt much better after last visit for that day. She states that the following day she developed a headache mid morning that did not respond to medication. She states that after work she went home and went to bed and woke up without head pain the following morning. She states that it was the most severe headache she has had since she started the new medication. She states that she has not really had any head pain since then and she has noted improvement in low back and neck symptoms. Denies any other associated symptoms or change in medical history since last visit.   ____________________________________________________  Initial Exam:  Yumi presents for evaluation and treatment of neck pain, headaches and low back pain. Patient states that she has a long history of headaches. She states that she gets migraines, tension type headaches and sinus headaches. She states that migraines have recently been helped with topamax and have been reduced to twice a month. She states that tension type headaches are more frequent, 4-5 times a week. She states that neck pain starts at the base of the skull and goes down to the top of the shoulder, R>L. She states that screen time, looking down, and stress increase symptoms while ibuprofen, heat/ice, massage, stretching help reduce symptoms. She states that low back pain began about 2 months ago without incident. She states that pain goes across the low back and into the buttock, L>R. She states that she occasionally has a pinching sensation in her left glute while walking. She states that bending and lifting increase symptoms,  while heat and rest help decrease symptoms. Patient denies any  difficulty speaking/swallowing, vision changes, bowel/bladder issues, chest pain/shortness of breath, numbness/tingling. Patient is new to chiropractic care.            Objective   Physical Exam  Constitutional:       General: She is not in acute distress.     Appearance: Normal appearance.       HENT:      Head: Normocephalic and atraumatic.   Musculoskeletal:      Cervical back: Tenderness present. Pain with movement present. Decreased range of motion.      Thoracic back: Tenderness present.      Lumbar back: Tenderness present. Decreased range of motion.   Neurological:      General: No focal deficit present.      Mental Status: She is alert and oriented to person, place, and time.      Cranial Nerves: No dysarthria or facial asymmetry.      Sensory: Sensation is intact.      Motor: Motor function is intact.      Coordination: Coordination is intact.      Gait: Gait is intact.   Psychiatric:         Attention and Perception: Attention normal.         Mood and Affect: Mood normal.         Speech: Speech normal.         Behavior: Behavior is cooperative.         Palpation of the following regions revealed:  Cervical: Suboccipitals bilateral, hypertonicity and tenderness.  Upper trapezius bilateral, hypertonicity and tenderness.  Levator scapulae bilateral, hypertonicity and tenderness.  Cervical paraspinals bilateral, hypertonicity and tenderness.  Thoracic: Thoracic paraspinals bilateral, hypertonicity and tenderness.  Middle trapezius bilateral, hypertonicity and tenderness.  Pectoralis bilateral, hypertonicity and tenderness.  Lumbar: Lumbar paraspinals bilateral, hypertonicity and tenderness.  Gluteal bilateral, hypertonicity and tenderness.    Segmental Joint(s): Segmental joint dysfunction was assessed with motion palpation and is identified in the following areas:  Cervical : C0 C1 C5 C6 C7  Thoracic : T2., T3, T6, T7, and T10  Lumbopelvic /  Sacral SIJ : L1 and L5/S1  Upper Extremities : R Glenohumeral joint and L Glenohumeral joint  Lower Extremities : R Hip and L Hip    Assessment/Plan   Today's Treatment Included: Spinal manipulation to the following regions of segmental dysfunction identified on examination, using age-appropriate and injury specific force. Segmental Joint(s) Cervical : C0 C1 C5 C6 C7 Segmental Joint(s) Thoracic : T2., T3, T6, T7, and T10 Segmental Joint(s) Lumbopelvic/Sacral SIJ : L1 and L5/S1  Extremity manipulation performed to the following regions: Upper Extremities : R Glenohumeral joint and L Glenohumeral joint Lower Extremities : R Hip and L Hip  Chiropractic manipulation treatment techniques utilized: Diversified CMT, Cervical Manual Traction, and Long-Axis Traction   Soft tissue mobilization techniques utilized during treatment: Pin and Stretch, Cupping, and Ischemic compression  Neuromuscular Re-Education (46928): 2 Units; Start time: 9:45 am  End time: 10:10 am      Soft-tissue mobilization was performed in the following areas:  Suboccipital bilateral, Cervical paraspinal mm. bilateral, Upper Trapezius bilateral, and Levator Scap. bilateral  Thoracic Paraspinal mm. bilateral, Middle Trapezius bilateral, and Pectoralis bilateral  Lumbar Paraspinal mm. bilateral and Gluteal mm.  bilateral    Recommended follow-up in 1 week(s).     The patient tolerated today's treatment with little or no additional discomfort and was instructed to contact the office for questions or concerns.

## 2025-01-23 ENCOUNTER — APPOINTMENT (OUTPATIENT)
Dept: INTEGRATIVE MEDICINE | Facility: CLINIC | Age: 35
End: 2025-01-23
Payer: COMMERCIAL

## 2025-01-23 DIAGNOSIS — M79.9 POSTURAL STRAIN: ICD-10-CM

## 2025-01-23 DIAGNOSIS — G44.86 CERVICOGENIC HEADACHE: ICD-10-CM

## 2025-01-23 DIAGNOSIS — M54.2 CERVICALGIA: ICD-10-CM

## 2025-01-23 DIAGNOSIS — M99.02 SEGMENTAL AND SOMATIC DYSFUNCTION OF THORACIC REGION: ICD-10-CM

## 2025-01-23 DIAGNOSIS — M54.59 MECHANICAL LOW BACK PAIN: ICD-10-CM

## 2025-01-23 DIAGNOSIS — M99.01 SEGMENTAL AND SOMATIC DYSFUNCTION OF CERVICAL REGION: Primary | ICD-10-CM

## 2025-01-23 DIAGNOSIS — M99.04 SEGMENTAL AND SOMATIC DYSFUNCTION OF SACRAL REGION: ICD-10-CM

## 2025-01-23 DIAGNOSIS — M79.10 MYALGIA: ICD-10-CM

## 2025-01-23 DIAGNOSIS — M99.03 SEGMENTAL AND SOMATIC DYSFUNCTION OF LUMBAR REGION: ICD-10-CM

## 2025-01-23 PROCEDURE — 98941 CHIROPRACT MANJ 3-4 REGIONS: CPT | Performed by: CHIROPRACTOR

## 2025-01-23 PROCEDURE — 97112 NEUROMUSCULAR REEDUCATION: CPT | Performed by: CHIROPRACTOR

## 2025-01-28 ENCOUNTER — APPOINTMENT (OUTPATIENT)
Dept: INTEGRATIVE MEDICINE | Facility: CLINIC | Age: 35
End: 2025-01-28
Payer: COMMERCIAL

## 2025-01-28 PROCEDURE — MASS60E MASSAGE 60 MINUTES EMPLOYEE RATE

## 2025-01-28 NOTE — PROGRESS NOTES
Massage Therapy Visit:     uYmi Richard     Condition of Client Subjective :  Patient ID: Yumi Richard is a 34 y.o. 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 Moderate pressure upper body massage, MFR throughout scalp/face, palpations knuckle strokes stripping pin/stretch cross fiber frictions and ischemic pressure throughout scalp face upper traps levator scapulas scalenes omohyoids sternocleidomastoids supraspinatus  occipital attachments and splenis capitis/cervicis, pin/breath knuckle strokes throughout pectoralis major/minor,pétrissage stripping knuckle strokes cross fiber frictions and compressions throughout trapezius rhomboids erector spinae infraspinatus quadratus lumborum latissimus dorsi obliques         Session Information  Visit Type: Follow-up visit  Description of present complaint: Stress, Headache, Discomfort, Muscle tension, Range of motion (ROM), Fatigue, Myofascial pain, Chronic pain, Gait issues    Before providing massage therapy, I discussed the provision of massage therapy services and any pertinent issues related to the patient's status with the patient's nurse. I implemented fall prevention measures including handrails, nonskid socks, and having a call light within reach. Following massage therapy, I provided a report to the patient's nurse.    Review of Systems    Objective   Pre-treatment Assessment  Arrival Mode: Ambulatory  Treatment Precautions: None    Physical Exam    Actions Assessment/Plan :  Provider reviewed plan for the massage session, precautions and contraindications. Patient/guardian/hospital staff has given consent to  treat with full understanding of what to expect during the session. Before massage therapy began, provider explained to the patient to communicate at any time if the pressure was causing discomfort past their tolerance level. Patient agreed to advise therapist.    Massage Treatment  Denies Allergy: Patient denies allergy to topical lubricant  Patient Position: Table, Prone, Supine  Positioning Assistance: Pillow(s)/bolster under knees while supine, Did not need assistance, Pillow(s)/bolster under anles while prone  Pressure Scale: 4 - Moderate pressure    Response:  Post-treatment Assessment  Patient Fell Asleep During Treatment: No  Patient Noted Improvement of the Following Symptoms: Muscle tension, ROM    Evaluation:

## 2025-02-05 ENCOUNTER — TELEPHONE (OUTPATIENT)
Dept: PRIMARY CARE | Facility: CLINIC | Age: 35
End: 2025-02-05
Payer: COMMERCIAL

## 2025-02-05 DIAGNOSIS — G43.709 CHRONIC MIGRAINE WITHOUT AURA WITHOUT STATUS MIGRAINOSUS, NOT INTRACTABLE: ICD-10-CM

## 2025-02-05 RX ORDER — TOPIRAMATE 25 MG/1
25 TABLET ORAL DAILY
Qty: 90 TABLET | Refills: 2 | Status: SHIPPED | OUTPATIENT
Start: 2025-02-05 | End: 2025-11-02

## 2025-02-05 NOTE — TELEPHONE ENCOUNTER
Pt calling for a refill    LOV 10/29/2024    Toprimate 25mg  1 po every day  30 days    GLORIA Stanford

## 2025-02-13 ENCOUNTER — TELEPHONE (OUTPATIENT)
Dept: PRIMARY CARE | Facility: CLINIC | Age: 35
End: 2025-02-13
Payer: COMMERCIAL

## 2025-02-13 DIAGNOSIS — F41.9 ANXIETY: ICD-10-CM

## 2025-02-13 NOTE — TELEPHONE ENCOUNTER
Pt calling for a refill    LOV 10/29/2024  NOV 3/20/2025    Bupropion XL 150mg  1 PO every day  90 days    GLORIA Stanford

## 2025-02-14 RX ORDER — BUPROPION HYDROCHLORIDE 150 MG/1
150 TABLET ORAL DAILY
Qty: 90 TABLET | Refills: 2 | Status: SHIPPED | OUTPATIENT
Start: 2025-02-14 | End: 2026-02-14

## 2025-02-19 ENCOUNTER — APPOINTMENT (OUTPATIENT)
Dept: INTEGRATIVE MEDICINE | Facility: CLINIC | Age: 35
End: 2025-02-19
Payer: COMMERCIAL

## 2025-02-19 PROCEDURE — MASS60E MASSAGE 60 MINUTES EMPLOYEE RATE

## 2025-02-20 NOTE — PROGRESS NOTES
Massage Therapy Visit:     Yumi Richard     Condition of Client Subjective :  Patient ID: Yumi Richard is a 34 y.o. 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 Moderate pressure upper body massage, MFR throughout scalp/face, palpations knuckle strokes stripping pin/stretch cross fiber frictions and ischemic pressure throughout scalp face upper traps levator scapulas scalenes omohyoids sternocleidomastoids supraspinatus  occipital attachments and splenis capitis/cervicis, pin/breath knuckle strokes throughout pectoralis major/minor,pétrissage stripping knuckle strokes cross fiber frictions and compressions throughout trapezius rhomboids erector spinae infraspinatus quadratus lumborum latissimus dorsi obliques         Session Information  Visit Type: Follow-up visit  Description of present complaint: Stress, Headache, Discomfort, Muscle tension, Range of motion (ROM), Fatigue, Myofascial pain, Chronic pain, Gait issues    Before providing massage therapy, I discussed the provision of massage therapy services and any pertinent issues related to the patient's status with the patient's nurse. I implemented fall prevention measures including handrails, nonskid socks, and having a call light within reach. Following massage therapy, I provided a report to the patient's nurse.    Review of Systems    Objective   Pre-treatment Assessment  Arrival Mode: Ambulatory  Treatment Precautions: None    Physical Exam    Actions Assessment/Plan :  Provider reviewed plan for the massage session, precautions and contraindications. Patient/guardian/hospital staff has given consent to  treat with full understanding of what to expect during the session. Before massage therapy began, provider explained to the patient to communicate at any time if the pressure was causing discomfort past their tolerance level. Patient agreed to advise therapist.    Massage Treatment  Denies Allergy: Patient denies allergy to topical lubricant  Patient Position: Table, Prone, Supine  Positioning Assistance: Did not need assistance, Pillow(s)/bolster under knees while supine, Pillow(s)/bolster under anles while prone  Pressure Scale: 4 - Moderate pressure    Response:  Post-treatment Assessment  Patient Fell Asleep During Treatment: No  Patient Noted Improvement of the Following Symptoms: Muscle tension, ROM    Evaluation:

## 2025-02-26 ENCOUNTER — APPOINTMENT (OUTPATIENT)
Dept: INTEGRATIVE MEDICINE | Facility: CLINIC | Age: 35
End: 2025-02-26
Payer: COMMERCIAL

## 2025-02-26 DIAGNOSIS — M54.2 CERVICALGIA: ICD-10-CM

## 2025-02-26 DIAGNOSIS — M99.03 SEGMENTAL AND SOMATIC DYSFUNCTION OF LUMBAR REGION: ICD-10-CM

## 2025-02-26 DIAGNOSIS — M99.02 SEGMENTAL AND SOMATIC DYSFUNCTION OF THORACIC REGION: ICD-10-CM

## 2025-02-26 DIAGNOSIS — M99.04 SEGMENTAL AND SOMATIC DYSFUNCTION OF SACRAL REGION: ICD-10-CM

## 2025-02-26 DIAGNOSIS — M99.01 SEGMENTAL AND SOMATIC DYSFUNCTION OF CERVICAL REGION: Primary | ICD-10-CM

## 2025-02-26 DIAGNOSIS — M79.9 POSTURAL STRAIN: ICD-10-CM

## 2025-02-26 DIAGNOSIS — M79.10 MYALGIA: ICD-10-CM

## 2025-02-26 DIAGNOSIS — G44.86 CERVICOGENIC HEADACHE: ICD-10-CM

## 2025-02-26 DIAGNOSIS — M54.59 MECHANICAL LOW BACK PAIN: ICD-10-CM

## 2025-02-26 PROCEDURE — 98941 CHIROPRACT MANJ 3-4 REGIONS: CPT | Performed by: CHIROPRACTOR

## 2025-02-26 PROCEDURE — 97112 NEUROMUSCULAR REEDUCATION: CPT | Performed by: CHIROPRACTOR

## 2025-02-26 NOTE — PROGRESS NOTES
Subjective   Patient ID: Yumi Richard is a 34 y.o. female who presents February 26, 2025 for neck pain, headaches and low back pain.    (4/20) VPCY    Today, the patient rates their degree of pain as a 1 out of 10 on the numeric pain rating scale.     Yumi returns for continued treatment of neck pain, headaches and low back pain. Patient states that she is great today. She states that she has had improvement in symptoms overall since treatment began. She states that headaches have returned slightly from the change in seasons/weather but are less severe. Denies any other associated symptoms or change in medical history since last visit.   ____________________________________________________  Initial Exam:  Yumi presents for evaluation and treatment of neck pain, headaches and low back pain. Patient states that she has a long history of headaches. She states that she gets migraines, tension type headaches and sinus headaches. She states that migraines have recently been helped with topamax and have been reduced to twice a month. She states that tension type headaches are more frequent, 4-5 times a week. She states that neck pain starts at the base of the skull and goes down to the top of the shoulder, R>L. She states that screen time, looking down, and stress increase symptoms while ibuprofen, heat/ice, massage, stretching help reduce symptoms. She states that low back pain began about 2 months ago without incident. She states that pain goes across the low back and into the buttock, L>R. She states that she occasionally has a pinching sensation in her left glute while walking. She states that bending and lifting increase symptoms, while heat and rest help decrease symptoms. Patient denies any  difficulty speaking/swallowing, vision changes, bowel/bladder issues, chest pain/shortness of breath, numbness/tingling. Patient is new to chiropractic care.            Objective   Physical Exam  Constitutional:        General: She is not in acute distress.     Appearance: Normal appearance.       HENT:      Head: Normocephalic and atraumatic.   Musculoskeletal:      Cervical back: Tenderness present. Pain with movement present. Decreased range of motion.      Thoracic back: Tenderness present.      Lumbar back: Tenderness present. Decreased range of motion.   Neurological:      General: No focal deficit present.      Mental Status: She is alert and oriented to person, place, and time.      Cranial Nerves: No dysarthria or facial asymmetry.      Sensory: Sensation is intact.      Motor: Motor function is intact.      Coordination: Coordination is intact.      Gait: Gait is intact.   Psychiatric:         Attention and Perception: Attention normal.         Mood and Affect: Mood normal.         Speech: Speech normal.         Behavior: Behavior is cooperative.       Palpation of the following regions revealed:  Cervical: Suboccipitals bilateral, hypertonicity and tenderness.  Upper trapezius bilateral, hypertonicity and tenderness.  Levator scapulae bilateral, hypertonicity and tenderness.  Cervical paraspinals bilateral, hypertonicity and tenderness.  Thoracic: Thoracic paraspinals bilateral, hypertonicity and tenderness.  Middle trapezius bilateral, hypertonicity and tenderness.  Pectoralis bilateral, hypertonicity and tenderness.  Lumbar: Lumbar paraspinals bilateral, hypertonicity and tenderness.  Gluteal bilateral, hypertonicity and tenderness.    Segmental Joint(s): Segmental joint dysfunction was assessed with motion palpation and is identified in the following areas:  Cervical : C0 C1 C5 C6 C7  Thoracic : T2., T3, T6, T7, and T10  Lumbopelvic / Sacral SIJ : L1 and L5/S1  Upper Extremities : R Glenohumeral joint and L Glenohumeral joint  Lower Extremities : R Hip and L Hip    Assessment/Plan   Today's Treatment Included: Spinal manipulation to the following regions of segmental dysfunction identified on examination, using  age-appropriate and injury specific force. Segmental Joint(s) Cervical : C0 C1 C5 C6 C7 Segmental Joint(s) Thoracic : T2., T3, T6, T7, and T10 Segmental Joint(s) Lumbopelvic/Sacral SIJ : L1 and L5/S1  Extremity manipulation performed to the following regions: Upper Extremities : R Glenohumeral joint and L Glenohumeral joint Lower Extremities : R Hip and L Hip  Chiropractic manipulation treatment techniques utilized: Diversified CMT, Cervical Manual Traction, and Long-Axis Traction   Soft tissue mobilization techniques utilized during treatment: Pin and Stretch, Cupping, and Ischemic compression  Neuromuscular Re-Education (38696): 2 Units; Start time: 10:45 am  End time: 11:10 am      Soft-tissue mobilization was performed in the following areas:  Suboccipital bilateral, Cervical paraspinal mm. bilateral, Upper Trapezius bilateral, and Levator Scap. bilateral  Thoracic Paraspinal mm. bilateral, Middle Trapezius bilateral, and Pectoralis bilateral  Lumbar Paraspinal mm. bilateral and Gluteal mm.  bilateral    Recommended follow-up in 3 week(s).     The patient tolerated today's treatment with little or no additional discomfort and was instructed to contact the office for questions or concerns.

## 2025-03-19 ENCOUNTER — ALLIED HEALTH (OUTPATIENT)
Dept: INTEGRATIVE MEDICINE | Facility: CLINIC | Age: 35
End: 2025-03-19
Payer: COMMERCIAL

## 2025-03-19 ENCOUNTER — APPOINTMENT (OUTPATIENT)
Dept: INTEGRATIVE MEDICINE | Facility: CLINIC | Age: 35
End: 2025-03-19
Payer: COMMERCIAL

## 2025-03-19 DIAGNOSIS — M79.10 MYALGIA: ICD-10-CM

## 2025-03-19 DIAGNOSIS — M54.2 CERVICALGIA: ICD-10-CM

## 2025-03-19 DIAGNOSIS — G44.86 CERVICOGENIC HEADACHE: ICD-10-CM

## 2025-03-19 DIAGNOSIS — M99.02 SEGMENTAL AND SOMATIC DYSFUNCTION OF THORACIC REGION: ICD-10-CM

## 2025-03-19 DIAGNOSIS — M99.03 SEGMENTAL AND SOMATIC DYSFUNCTION OF LUMBAR REGION: ICD-10-CM

## 2025-03-19 DIAGNOSIS — M54.59 MECHANICAL LOW BACK PAIN: ICD-10-CM

## 2025-03-19 DIAGNOSIS — M99.04 SEGMENTAL AND SOMATIC DYSFUNCTION OF SACRAL REGION: ICD-10-CM

## 2025-03-19 DIAGNOSIS — M79.9 POSTURAL STRAIN: ICD-10-CM

## 2025-03-19 DIAGNOSIS — M99.01 SEGMENTAL AND SOMATIC DYSFUNCTION OF CERVICAL REGION: Primary | ICD-10-CM

## 2025-03-19 PROCEDURE — 98941 CHIROPRACT MANJ 3-4 REGIONS: CPT | Performed by: CHIROPRACTOR

## 2025-03-19 PROCEDURE — MASS60E MASSAGE 60 MINUTES EMPLOYEE RATE

## 2025-03-19 PROCEDURE — 97112 NEUROMUSCULAR REEDUCATION: CPT | Performed by: CHIROPRACTOR

## 2025-03-19 NOTE — PROGRESS NOTES
Subjective   Patient ID: Yumi Richard is a 34 y.o. female who presents March 19, 2025 for neck pain, headaches and low back pain.    (5/20) VPCY    Today, the patient rates their degree of pain as a 2 out of 10 on the numeric pain rating scale.     Yumi returns for continued treatment of neck pain, headaches and low back pain. Patient states that she is okay today. She states that is more achy today and has had more head pain over the last week or so. She states that she is getting over the flu. She states that she also had an issue with a medication refill and had a decreased dose which could cause headaches. Denies any other associated symptoms or change in medical history since last visit.   ____________________________________________________  Initial Exam:  Yumi presents for evaluation and treatment of neck pain, headaches and low back pain. Patient states that she has a long history of headaches. She states that she gets migraines, tension type headaches and sinus headaches. She states that migraines have recently been helped with topamax and have been reduced to twice a month. She states that tension type headaches are more frequent, 4-5 times a week. She states that neck pain starts at the base of the skull and goes down to the top of the shoulder, R>L. She states that screen time, looking down, and stress increase symptoms while ibuprofen, heat/ice, massage, stretching help reduce symptoms. She states that low back pain began about 2 months ago without incident. She states that pain goes across the low back and into the buttock, L>R. She states that she occasionally has a pinching sensation in her left glute while walking. She states that bending and lifting increase symptoms, while heat and rest help decrease symptoms. Patient denies any  difficulty speaking/swallowing, vision changes, bowel/bladder issues, chest pain/shortness of breath, numbness/tingling. Patient is new to chiropractic care.             Objective   Physical Exam  Constitutional:       General: She is not in acute distress.     Appearance: Normal appearance.       HENT:      Head: Normocephalic and atraumatic.   Musculoskeletal:      Cervical back: Tenderness present. Pain with movement present. Decreased range of motion.      Thoracic back: Tenderness present.      Lumbar back: Tenderness present. Decreased range of motion.   Neurological:      General: No focal deficit present.      Mental Status: She is alert and oriented to person, place, and time.      Cranial Nerves: No dysarthria or facial asymmetry.      Sensory: Sensation is intact.      Motor: Motor function is intact.      Coordination: Coordination is intact.      Gait: Gait is intact.   Psychiatric:         Attention and Perception: Attention normal.         Mood and Affect: Mood normal.         Speech: Speech normal.         Behavior: Behavior is cooperative.         Palpation of the following regions revealed:  Cervical: Suboccipitals bilateral, hypertonicity and tenderness.  Upper trapezius bilateral, hypertonicity and tenderness.  Levator scapulae bilateral, hypertonicity and tenderness.  Cervical paraspinals bilateral, hypertonicity and tenderness.  Thoracic: Thoracic paraspinals bilateral, hypertonicity and tenderness.  Middle trapezius bilateral, hypertonicity and tenderness.  Pectoralis bilateral, hypertonicity and tenderness.  Lumbar: Lumbar paraspinals bilateral, hypertonicity and tenderness.  Gluteal bilateral, hypertonicity and tenderness.    Segmental Joint(s): Segmental joint dysfunction was assessed with motion palpation and is identified in the following areas:  Cervical : C0 C1 C5 C6 C7  Thoracic : T2., T3, T6, T7, and T10  Lumbopelvic / Sacral SIJ : L1 and L5/S1  Upper Extremities : R Glenohumeral joint and L Glenohumeral joint  Lower Extremities : R Hip and L Hip    Assessment/Plan   Today's Treatment Included: Spinal manipulation to the following regions  of segmental dysfunction identified on examination, using age-appropriate and injury specific force. Segmental Joint(s) Cervical : C0 C1 C5 C6 C7 Segmental Joint(s) Thoracic : T2., T3, T6, T7, and T10 Segmental Joint(s) Lumbopelvic/Sacral SIJ : L1 and L5/S1  Extremity manipulation performed to the following regions: Upper Extremities : R Glenohumeral joint and L Glenohumeral joint Lower Extremities : R Hip and L Hip  Chiropractic manipulation treatment techniques utilized: Diversified CMT, Cervical Manual Traction, and Long-Axis Traction   Soft tissue mobilization techniques utilized during treatment: Pin and Stretch, Cupping, and Ischemic compression  Neuromuscular Re-Education (08857): 1 Units; Start time: 1:05 pm  End time: 1:15 pm      Soft-tissue mobilization was performed in the following areas:  Suboccipital bilateral, Cervical paraspinal mm. bilateral, Upper Trapezius bilateral, and Levator Scap. bilateral  Thoracic Paraspinal mm. bilateral, Middle Trapezius bilateral, and Pectoralis bilateral  Lumbar Paraspinal mm. bilateral and Gluteal mm.  bilateral    Recommended follow-up in 3 week(s).     The patient tolerated today's treatment with little or no additional discomfort and was instructed to contact the office for questions or concerns.

## 2025-03-19 NOTE — PROGRESS NOTES
Massage Therapy Visit:     Yumi Richard     Condition of Client Subjective :  Patient ID: Yumi Richard is a 34 y.o. 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 Moderate pressure upper body massage, MFR throughout scalp/face, palpations knuckle strokes stripping pin/stretch cross fiber frictions and ischemic pressure throughout scalp face upper traps levator scapulas scalenes omohyoids sternocleidomastoids supraspinatus  occipital attachments and splenis capitis/cervicis, pin/breath knuckle strokes throughout pectoralis major/minor,pétrissage stripping knuckle strokes cross fiber frictions and compressions throughout trapezius rhomboids erector spinae infraspinatus quadratus lumborum latissimus dorsi obliques         Session Information  Visit Type: Follow-up visit  Description of present complaint: Stress, Headache, Discomfort, Muscle tension, Range of motion (ROM), Fatigue, Myofascial pain, Chronic pain, Gait issues    Before providing massage therapy, I discussed the provision of massage therapy services and any pertinent issues related to the patient's status with the patient's nurse. I implemented fall prevention measures including handrails, nonskid socks, and having a call light within reach. Following massage therapy, I provided a report to the patient's nurse.    Review of Systems    Objective   Pre-treatment Assessment  Arrival Mode: Ambulatory  Treatment Precautions: None    Physical Exam    Actions Assessment/Plan :  Provider reviewed plan for the massage session, precautions and contraindications. Patient/guardian/hospital staff has given consent to  treat with full understanding of what to expect during the session. Before massage therapy began, provider explained to the patient to communicate at any time if the pressure was causing discomfort past their tolerance level. Patient agreed to advise therapist.    Massage Treatment  Denies Allergy: Patient denies allergy to topical lubricant  Patient Position: Table, Prone, Supine  Positioning Assistance: Did not need assistance, Pillow(s)/bolster under knees while supine, Pillow(s)/bolster under anles while prone  Pressure Scale: 4 - Moderate pressure    Response:  Post-treatment Assessment  Patient Fell Asleep During Treatment: No  Patient Noted Improvement of the Following Symptoms: Muscle tension, ROM    Evaluation:

## 2025-03-20 ENCOUNTER — OFFICE VISIT (OUTPATIENT)
Dept: PRIMARY CARE | Facility: CLINIC | Age: 35
End: 2025-03-20
Payer: COMMERCIAL

## 2025-03-20 ENCOUNTER — PATIENT MESSAGE (OUTPATIENT)
Dept: PRIMARY CARE | Facility: CLINIC | Age: 35
End: 2025-03-20

## 2025-03-20 VITALS
TEMPERATURE: 98.8 F | HEIGHT: 62 IN | SYSTOLIC BLOOD PRESSURE: 112 MMHG | BODY MASS INDEX: 19.1 KG/M2 | HEART RATE: 82 BPM | WEIGHT: 103.8 LBS | OXYGEN SATURATION: 100 % | DIASTOLIC BLOOD PRESSURE: 78 MMHG

## 2025-03-20 DIAGNOSIS — R35.0 FREQUENCY OF URINATION: ICD-10-CM

## 2025-03-20 DIAGNOSIS — R82.81 PYURIA: ICD-10-CM

## 2025-03-20 DIAGNOSIS — F90.0 ATTENTION DEFICIT HYPERACTIVITY DISORDER (ADHD), PREDOMINANTLY INATTENTIVE TYPE: ICD-10-CM

## 2025-03-20 DIAGNOSIS — Z00.01 ENCOUNTER FOR ANNUAL GENERAL MEDICAL EXAMINATION WITH ABNORMAL FINDINGS IN ADULT: Primary | ICD-10-CM

## 2025-03-20 DIAGNOSIS — Z23 NEED FOR VACCINATION: ICD-10-CM

## 2025-03-20 DIAGNOSIS — F33.8 SEASONAL DEPRESSION (CMS-HCC): ICD-10-CM

## 2025-03-20 DIAGNOSIS — M22.2X1 RIGHT PATELLOFEMORAL SYNDROME: ICD-10-CM

## 2025-03-20 DIAGNOSIS — F90.0 ATTENTION DEFICIT HYPERACTIVITY DISORDER (ADHD), PREDOMINANTLY INATTENTIVE TYPE: Primary | ICD-10-CM

## 2025-03-20 DIAGNOSIS — G43.709 CHRONIC MIGRAINE WITHOUT AURA WITHOUT STATUS MIGRAINOSUS, NOT INTRACTABLE: ICD-10-CM

## 2025-03-20 DIAGNOSIS — Z13.9 SCREENING FOR CONDITION: ICD-10-CM

## 2025-03-20 DIAGNOSIS — Z87.09 HISTORY OF TRACHEAL STENOSIS: ICD-10-CM

## 2025-03-20 PROBLEM — F41.9 ANXIETY: Status: RESOLVED | Noted: 2023-06-01 | Resolved: 2025-03-20

## 2025-03-20 PROBLEM — F40.243 FEAR OF FLYING: Status: RESOLVED | Noted: 2023-06-01 | Resolved: 2025-03-20

## 2025-03-20 PROBLEM — F32.0 DEPRESSION, MAJOR, SINGLE EPISODE, MILD (CMS-HCC): Status: RESOLVED | Noted: 2023-06-01 | Resolved: 2025-03-20

## 2025-03-20 PROBLEM — N87.9 CERVICAL ATYPIA: Status: RESOLVED | Noted: 2024-07-22 | Resolved: 2025-03-20

## 2025-03-20 PROBLEM — T75.3XXA MOTION SICKNESS: Status: RESOLVED | Noted: 2023-06-01 | Resolved: 2025-03-20

## 2025-03-20 PROBLEM — M54.2 CERVICAL PAIN: Status: RESOLVED | Noted: 2023-06-01 | Resolved: 2025-03-20

## 2025-03-20 LAB
POC APPEARANCE, URINE: ABNORMAL
POC BILIRUBIN, URINE: NEGATIVE
POC BLOOD, URINE: ABNORMAL
POC COLOR, URINE: YELLOW
POC GLUCOSE, URINE: NEGATIVE MG/DL
POC KETONES, URINE: NEGATIVE MG/DL
POC LEUKOCYTES, URINE: ABNORMAL
POC NITRITE,URINE: NEGATIVE
POC PH, URINE: 7 PH
POC PROTEIN, URINE: NEGATIVE MG/DL
POC SPECIFIC GRAVITY, URINE: 1.02
POC UROBILINOGEN, URINE: 0.2 EU/DL

## 2025-03-20 PROCEDURE — 3008F BODY MASS INDEX DOCD: CPT | Performed by: STUDENT IN AN ORGANIZED HEALTH CARE EDUCATION/TRAINING PROGRAM

## 2025-03-20 PROCEDURE — 99204 OFFICE O/P NEW MOD 45 MIN: CPT | Performed by: STUDENT IN AN ORGANIZED HEALTH CARE EDUCATION/TRAINING PROGRAM

## 2025-03-20 PROCEDURE — 99385 PREV VISIT NEW AGE 18-39: CPT | Performed by: STUDENT IN AN ORGANIZED HEALTH CARE EDUCATION/TRAINING PROGRAM

## 2025-03-20 PROCEDURE — 81003 URINALYSIS AUTO W/O SCOPE: CPT | Performed by: STUDENT IN AN ORGANIZED HEALTH CARE EDUCATION/TRAINING PROGRAM

## 2025-03-20 PROCEDURE — 1036F TOBACCO NON-USER: CPT | Performed by: STUDENT IN AN ORGANIZED HEALTH CARE EDUCATION/TRAINING PROGRAM

## 2025-03-20 RX ORDER — SUMATRIPTAN SUCCINATE 50 MG/1
50 TABLET ORAL ONCE AS NEEDED
Qty: 9 TABLET | Refills: 5 | Status: SHIPPED | OUTPATIENT
Start: 2025-03-20 | End: 2026-03-20

## 2025-03-20 RX ORDER — BUPROPION HYDROCHLORIDE 150 MG/1
150 TABLET ORAL DAILY
Qty: 90 TABLET | Refills: 3 | Status: SHIPPED | OUTPATIENT
Start: 2025-03-20 | End: 2026-03-20

## 2025-03-20 RX ORDER — DEXTROAMPHETAMINE SACCHARATE, AMPHETAMINE ASPARTATE MONOHYDRATE, DEXTROAMPHETAMINE SULFATE AND AMPHETAMINE SULFATE 2.5; 2.5; 2.5; 2.5 MG/1; MG/1; MG/1; MG/1
CAPSULE, EXTENDED RELEASE ORAL
Qty: 50 CAPSULE | Refills: 0 | Status: SHIPPED | OUTPATIENT
Start: 2025-03-20

## 2025-03-20 RX ORDER — BUPROPION HYDROCHLORIDE 300 MG/1
300 TABLET ORAL DAILY
Qty: 90 TABLET | Refills: 3 | Status: SHIPPED | OUTPATIENT
Start: 2025-03-20

## 2025-03-20 ASSESSMENT — LIFESTYLE VARIABLES
HOW MANY STANDARD DRINKS CONTAINING ALCOHOL DO YOU HAVE ON A TYPICAL DAY: PATIENT DOES NOT DRINK
HOW OFTEN DO YOU HAVE SIX OR MORE DRINKS ON ONE OCCASION: LESS THAN MONTHLY
AUDIT-C TOTAL SCORE: 2
SKIP TO QUESTIONS 9-10: 0
AUDIT-C TOTAL SCORE: 2
HOW OFTEN DO YOU HAVE A DRINK CONTAINING ALCOHOL: MONTHLY OR LESS
HOW OFTEN DO YOU HAVE SIX OR MORE DRINKS ON ONE OCCASION: LESS THAN MONTHLY
SKIP TO QUESTIONS 9-10: 0
HOW OFTEN DO YOU HAVE A DRINK CONTAINING ALCOHOL: MONTHLY OR LESS
HOW MANY STANDARD DRINKS CONTAINING ALCOHOL DO YOU HAVE ON A TYPICAL DAY: 1 OR 2

## 2025-03-20 ASSESSMENT — PATIENT HEALTH QUESTIONNAIRE - PHQ9
1. LITTLE INTEREST OR PLEASURE IN DOING THINGS: NOT AT ALL
SUM OF ALL RESPONSES TO PHQ9 QUESTIONS 1 AND 2: 0
2. FEELING DOWN, DEPRESSED OR HOPELESS: NOT AT ALL
2. FEELING DOWN, DEPRESSED OR HOPELESS: SEVERAL DAYS
SUM OF ALL RESPONSES TO PHQ9 QUESTIONS 1 AND 2: 1
1. LITTLE INTEREST OR PLEASURE IN DOING THINGS: NOT AT ALL
10. IF YOU CHECKED OFF ANY PROBLEMS, HOW DIFFICULT HAVE THESE PROBLEMS MADE IT FOR YOU TO DO YOUR WORK, TAKE CARE OF THINGS AT HOME, OR GET ALONG WITH OTHER PEOPLE: SOMEWHAT DIFFICULT

## 2025-03-20 ASSESSMENT — PAIN SCALES - GENERAL: PAINLEVEL_OUTOF10: 0-NO PAIN

## 2025-03-20 ASSESSMENT — ENCOUNTER SYMPTOMS
LOSS OF SENSATION IN FEET: 0
OCCASIONAL FEELINGS OF UNSTEADINESS: 0
DEPRESSION: 0

## 2025-03-20 NOTE — ASSESSMENT & PLAN NOTE
Chronic since a child, well controlled when on medication  Interested in trial of vyvanse - referral to clinical pharmacist to ensure covered or to assist with coverage  For the next month, will resume adderall - 10 daily x 1 weeks, the increase to 20 daily  At follow-up will need to sign controlled substance agreement  Pdmp reviewed, concordant  Pt interested in autism evaluation - clinic in [place] with over 1 year wait time, will reach out to patient to offer psychiatry or neurology evaluation    Orders:    Referral to Clinical Pharmacy; Future    CBC and Auto Differential; Future    Comprehensive Metabolic Panel; Future    Hemoglobin A1C; Future    TSH with reflex to Free T4 if abnormal; Future    Vitamin D 25-Hydroxy,Total (for eval of Vitamin D levels); Future    amphetamine-dextroamphetamine XR (Adderall XR) 10 mg 24 hr capsule; Take 1 tablet by mouth every morning for 1 week then increase to 2 tablets by mouth every morning. Do not crush or chew.    Drug Screen, Urine With Reflex to Confirmation; Future

## 2025-03-20 NOTE — ASSESSMENT & PLAN NOTE
Chronic, fairly well controlled per patient with wellbutrin 450 daily  Will reassess control in a few months and readdress if needed  Updating blood work to ensure no underlying abnormality  Supportive counseling and active listening provided  Orders:    buPROPion XL (Wellbutrin XL) 300 mg 24 hr tablet; Take 1 tablet (300 mg) by mouth once daily. Take with 150 mg for 450 mg total    buPROPion XL (Wellbutrin XL) 150 mg 24 hr tablet; Take 1 tablet (150 mg) by mouth once daily. Add to the 300 mg medication for 450mg total    CBC and Auto Differential; Future    Comprehensive Metabolic Panel; Future    Hemoglobin A1C; Future    TSH with reflex to Free T4 if abnormal; Future    Vitamin D 25-Hydroxy,Total (for eval of Vitamin D levels); Future    Hepatitis B Surface Antibody; Future

## 2025-03-20 NOTE — PROGRESS NOTES
OUTPATIENT VISIT - ANDREA Campos   Patient ID: Yumi Richard is a 34 y.o. female who presents for Establish Care (Medication issues, frequent urination, and wants to discuss adhd med).    HPI  HPI     Works as a nurse at [employer], labor and delivery    ADHD - was on adderall in past while at school (started in the 3rd grade, took daily for school and not on weekends/holidays) and did well. On the immediate release felt suppressed. Much better on extended release.  Feels spacy, inattentive. At work is able to focus on one thing at a time and struggles with multi-tasking/remembering.     Intubated after delivery (torn lung). Scar tissue of trachea. At school difficulty breathing, given an inhaler that did not work. Early 20s that couldn't walk as long or well as her 61yo mom. Had the scar tissue corrected x 3 times. Feels she has poor lung volume due to years of compensating for narrow trachea.     Appendix removed.     Depression - mostly seasonal, on wellbutrin 450mg. Feels like it is working. Was on several other medications in past but cannot remember it. Doing better on it than without it. Feels good most days. Thinks moving in to summer things might be better.     Has always wondered if on autism spectrum disorder.     Migraines - on topamax, sees chiropractor and massage therapist for this as well. Uses advail for break-through.     Right patellofemoral syndrome - has had PT, currently feeling okay.     Dad just had open heart surgery for pfo, patient was checked for this.      Objective     ROS  Review of Systems  All pertinent positive symptoms are included in the history of present illness.  All other systems have been reviewed and are negative and noncontributory to this patient's current ailments.    PHQ9/GAD7:  Patient Health Questionnaire-2 Score: 1 (3/20/2025  9:48 AM)   No data recorded   No data recorded     Current Medications  Current Outpatient Medications   Medication Instructions     5-hydroxytryptophan, 5-HTP, (5-HTP ORAL) Take by mouth.    amphetamine-dextroamphetamine XR (Adderall XR) 10 mg 24 hr capsule Take 1 tablet by mouth every morning for 1 week then increase to 2 tablets by mouth every morning. Do not crush or chew.    ascorbic acid (VITAMIN C) 1,000 mg, Daily    buPROPion XL (WELLBUTRIN XL) 300 mg, oral, Daily, Take with 150 mg for 450 mg total    buPROPion XL (WELLBUTRIN XL) 150 mg, oral, Daily, Add to the 300 mg medication for 450mg total    cholecalciferol (Vitamin D-3) 50 mcg (2,000 unit) capsule 2 times daily    levonorgestrel (Mirena) 21 mcg/24 hours (8 yrs) 52 mg IUD by intrauterine route.    multivitamin capsule Take by mouth.    ondansetron (ZOFRAN) 4 mg, oral, Every 8 hours PRN    SUMAtriptan (IMITREX) 50 mg, oral, Once as needed, May repeat after 2 hours.    topiramate (TOPAMAX) 25 mg, oral, Daily        Allergies  Allergies   Allergen Reactions    Oxycodone-Acetaminophen Other    Iodine Rash        VITAL SIGNS  Vitals:    03/20/25 0946   BP: 112/78   Pulse: 82   Temp: 37.1 °C (98.8 °F)   SpO2: 100%     Vitals:    03/20/25 0946   Weight: 47.1 kg (103 lb 12.8 oz)      Body mass index is 19.3 kg/m².     Physical Exam  Vitals and nursing note reviewed.   Constitutional:       General: She is not in acute distress.  HENT:      Head: Normocephalic.   Cardiovascular:      Rate and Rhythm: Normal rate and regular rhythm.   Pulmonary:      Effort: Pulmonary effort is normal. No respiratory distress.   Abdominal:      General: There is no distension.      Palpations: Abdomen is soft.      Tenderness: There is no abdominal tenderness.   Neurological:      General: No focal deficit present.      Mental Status: She is alert and oriented to person, place, and time.   Psychiatric:         Mood and Affect: Mood normal.         Thought Content: Thought content normal.       Assessment/Plan   Assessment & Plan  Encounter for annual general medical examination with abnormal findings in  adult  Reviewed and updated medication list and problem list  Up-to-date with pap - repeat 5/27/27  Updating blood work       Attention deficit hyperactivity disorder (ADHD), predominantly inattentive type  Chronic since a child, well controlled when on medication  Interested in trial of vyvanse - referral to clinical pharmacist to ensure covered or to assist with coverage  For the next month, will resume adderall - 10 daily x 1 weeks, the increase to 20 daily  At follow-up will need to sign controlled substance agreement  Pdmp reviewed, concordant  Pt interested in autism evaluation - clinic in [place] with over 1 year wait time, will reach out to patient to offer psychiatry or neurology evaluation    Orders:    Referral to Clinical Pharmacy; Future    CBC and Auto Differential; Future    Comprehensive Metabolic Panel; Future    Hemoglobin A1C; Future    TSH with reflex to Free T4 if abnormal; Future    Vitamin D 25-Hydroxy,Total (for eval of Vitamin D levels); Future    amphetamine-dextroamphetamine XR (Adderall XR) 10 mg 24 hr capsule; Take 1 tablet by mouth every morning for 1 week then increase to 2 tablets by mouth every morning. Do not crush or chew.    Drug Screen, Urine With Reflex to Confirmation; Future    Seasonal depression (CMS-HCC)  Chronic, fairly well controlled per patient with wellbutrin 450 daily  Will reassess control in a few months and readdress if needed  Updating blood work to ensure no underlying abnormality  Supportive counseling and active listening provided  Orders:    buPROPion XL (Wellbutrin XL) 300 mg 24 hr tablet; Take 1 tablet (300 mg) by mouth once daily. Take with 150 mg for 450 mg total    buPROPion XL (Wellbutrin XL) 150 mg 24 hr tablet; Take 1 tablet (150 mg) by mouth once daily. Add to the 300 mg medication for 450mg total    CBC and Auto Differential; Future    Comprehensive Metabolic Panel; Future    Hemoglobin A1C; Future    TSH with reflex to Free T4 if abnormal; Future     Vitamin D 25-Hydroxy,Total (for eval of Vitamin D levels); Future    Hepatitis B Surface Antibody; Future    Chronic migraine without aura without status migrainosus, not intractable  Chronic, intermittent breakthroughs on topamax  Trial imitrex prn breakthrough headache  Continue to monitor  Orders:    SUMAtriptan (Imitrex) 50 mg tablet; Take 1 tablet (50 mg) by mouth 1 time if needed for migraine. May repeat after 2 hours.    Right patellofemoral syndrome  Chronic, well controlled at this time  Has had therapy in past  Continue to monitor       Frequency of urination  POC urine positive for leukocytes  Will send for culture to determine if infectious and will treat accordingly  Continue increased hydration  Orders:    POCT UA Automated manually resulted    Urine Culture    Pyuria    Orders:    POCT UA Automated manually resulted    Urine Culture    History of tracheal stenosis  History of several surgeries, pt denies any current issues       Screening for condition    Orders:    Lipid Panel; Future    HIV 1/2 Antigen/Antibody Screen with Reflex to Confirmation; Future    Hepatitis C antibody; Future    T-Spot TB; Future    Need for vaccination  Will obtain titers to determine need for vaccines  Orders:    Hepatitis B Surface Antibody; Future    Mumps Antibody, IgG; Future    Measles (Rubeola) Antibody, IgG; Future    Rubella antibody, IgG; Future    Varicella zoster antibody, IgG; Future            Counseling:       Medication education:           Education:  The patient is counseled regarding potential side-effects of all new medications          Understanding:  Patient expressed understanding of information conveyed today          Adherence:  No barriers to adherence identified     Follow-up: 3 weeks video reassess adhd medication - sign control substance agreement and obtain UDS    ** Please excuse any errors in grammar or translation related to this dictation. Voice recognition software was utilized to prepare  this document. **          Nelda Huffman MD   03/20/25   6:10 PM

## 2025-03-20 NOTE — ASSESSMENT & PLAN NOTE
Chronic, intermittent breakthroughs on topamax  Trial imitrex prn breakthrough headache  Continue to monitor  Orders:    SUMAtriptan (Imitrex) 50 mg tablet; Take 1 tablet (50 mg) by mouth 1 time if needed for migraine. May repeat after 2 hours.

## 2025-03-21 PROBLEM — E78.00 ELEVATED LDL CHOLESTEROL LEVEL: Status: ACTIVE | Noted: 2025-03-21

## 2025-03-21 PROBLEM — D75.839 THROMBOCYTOSIS: Status: ACTIVE | Noted: 2025-03-21

## 2025-03-22 LAB
25(OH)D3+25(OH)D2 SERPL-MCNC: 95 NG/ML (ref 30–100)
ALBUMIN SERPL-MCNC: 4.8 G/DL (ref 3.6–5.1)
ALP SERPL-CCNC: 68 U/L (ref 31–125)
ALT SERPL-CCNC: 11 U/L (ref 6–29)
ANION GAP SERPL CALCULATED.4IONS-SCNC: 9 MMOL/L (CALC) (ref 7–17)
AST SERPL-CCNC: 12 U/L (ref 10–30)
BASOPHILS # BLD AUTO: 32 CELLS/UL (ref 0–200)
BASOPHILS NFR BLD AUTO: 0.5 %
BILIRUB SERPL-MCNC: 0.4 MG/DL (ref 0.2–1.2)
BUN SERPL-MCNC: 14 MG/DL (ref 7–25)
CALCIUM SERPL-MCNC: 9.6 MG/DL (ref 8.6–10.2)
CHLORIDE SERPL-SCNC: 104 MMOL/L (ref 98–110)
CHOLEST SERPL-MCNC: 218 MG/DL
CHOLEST/HDLC SERPL: 3.8 (CALC)
CO2 SERPL-SCNC: 26 MMOL/L (ref 20–32)
CREAT SERPL-MCNC: 0.88 MG/DL (ref 0.5–0.97)
EGFRCR SERPLBLD CKD-EPI 2021: 87 ML/MIN/1.73M2
EOSINOPHIL # BLD AUTO: 38 CELLS/UL (ref 15–500)
EOSINOPHIL NFR BLD AUTO: 0.6 %
ERYTHROCYTE [DISTWIDTH] IN BLOOD BY AUTOMATED COUNT: 11.8 % (ref 11–15)
EST. AVERAGE GLUCOSE BLD GHB EST-MCNC: 100 MG/DL
EST. AVERAGE GLUCOSE BLD GHB EST-SCNC: 5.5 MMOL/L
GLUCOSE SERPL-MCNC: 83 MG/DL (ref 65–139)
HBA1C MFR BLD: 5.1 % OF TOTAL HGB
HBV SURFACE AB SERPL IA-ACNC: 821 MIU/ML
HCT VFR BLD AUTO: 41 % (ref 35–45)
HCV AB SERPL QL IA: NORMAL
HDLC SERPL-MCNC: 57 MG/DL
HGB BLD-MCNC: 13.6 G/DL (ref 11.7–15.5)
HIV 1+2 AB+HIV1 P24 AG SERPL QL IA: NORMAL
IGNF NEG CNTRL BLD: NORMAL
LDLC SERPL CALC-MCNC: 137 MG/DL (CALC)
LYMPHOCYTES # BLD AUTO: 1493 CELLS/UL (ref 850–3900)
LYMPHOCYTES NFR BLD AUTO: 23.7 %
M TB IFN-G BLD-IMP: NEGATIVE
MCH RBC QN AUTO: 31.3 PG (ref 27–33)
MCHC RBC AUTO-ENTMCNC: 33.2 G/DL (ref 32–36)
MCV RBC AUTO: 94.5 FL (ref 80–100)
MEV IGG SER IA-ACNC: 244 AU/ML
MITOGEN IGNF.SPOT COUNT BLD: NORMAL
MONOCYTES # BLD AUTO: 258 CELLS/UL (ref 200–950)
MONOCYTES NFR BLD AUTO: 4.1 %
MUV IGG SER IA-ACNC: 23.3 AU/ML
NEUTROPHILS # BLD AUTO: 4479 CELLS/UL (ref 1500–7800)
NEUTROPHILS NFR BLD AUTO: 71.1 %
NONHDLC SERPL-MCNC: 161 MG/DL (CALC)
PLATELET # BLD AUTO: 452 THOUSAND/UL (ref 140–400)
PMV BLD REES-ECKER: 10.1 FL (ref 7.5–12.5)
POTASSIUM SERPL-SCNC: 4.2 MMOL/L (ref 3.5–5.3)
PROT SERPL-MCNC: 7.1 G/DL (ref 6.1–8.1)
QUEST PANEL A SPOT COUNT: 0
QUEST PANEL B SPOT COUNT: 1
RBC # BLD AUTO: 4.34 MILLION/UL (ref 3.8–5.1)
RUBV IGG SERPL IA-ACNC: 1.58 INDEX
SODIUM SERPL-SCNC: 139 MMOL/L (ref 135–146)
TRIGL SERPL-MCNC: 120 MG/DL
TSH SERPL-ACNC: 1.82 MIU/L
WBC # BLD AUTO: 6.3 THOUSAND/UL (ref 3.8–10.8)

## 2025-03-23 LAB — BACTERIA UR CULT: NORMAL

## 2025-03-25 LAB — VZV IGG SER IA-ACNC: 31 S/CO

## 2025-04-01 ENCOUNTER — APPOINTMENT (OUTPATIENT)
Dept: PHARMACY | Facility: HOSPITAL | Age: 35
End: 2025-04-01
Payer: COMMERCIAL

## 2025-04-01 DIAGNOSIS — F90.0 ATTENTION DEFICIT HYPERACTIVITY DISORDER (ADHD), PREDOMINANTLY INATTENTIVE TYPE: ICD-10-CM

## 2025-04-01 NOTE — Clinical Note
Patient's insurance covers generic Vyvanse at $15 per month no prior auths needed. Patient is happy with this finding. Sees you for follow up next week.

## 2025-04-01 NOTE — PROGRESS NOTES
Clinical Pharmacy Appointment    Patient ID: Yumi Richard is a 35 y.o. female who presents for ADHD.    Pt is here for First appointment.     Referring Provider/PCP: Nelda Huffman MD  Last visit with PCP: 3/20/25   Next visit with PCP: 4/8/25    Subjective   Medication Reconciliation:  Changed:   Added:   Discontinued:     Drug Interactions  No relevant drug interactions were noted.    Medication System Management  Patient's preferred pharmacy: [facility]  Adherence/Organization:  No issues reported  Affordability/Accessibility: No issues reported    HPI  MEDICATION ACCESS  The patient's current and/or proposed medication regimen was reviewed and discussed. The following medication access issues were identified and addressed:     Medication: Vyvanse  Discussion/Evaluation  Patient referred to make sure there would not be any insurance issues with starting Vyvanse. Test billing shows $15 for 1 month supply with no prior authorizations needed.    Objective   Allergies   Allergen Reactions    Oxycodone-Acetaminophen Other    Iodine Rash     Social History     Social History Narrative    Not on file      Medication Review  Current Outpatient Medications   Medication Instructions    5-hydroxytryptophan, 5-HTP, (5-HTP ORAL) Take by mouth.    amphetamine-dextroamphetamine XR (Adderall XR) 10 mg 24 hr capsule Take 1 tablet by mouth every morning for 1 week then increase to 2 tablets by mouth every morning. Do not crush or chew.    ascorbic acid (VITAMIN C) 1,000 mg, Daily    buPROPion XL (WELLBUTRIN XL) 300 mg, oral, Daily, Take with 150 mg for 450 mg total    buPROPion XL (WELLBUTRIN XL) 150 mg, oral, Daily, Add to the 300 mg medication for 450mg total    cholecalciferol (Vitamin D-3) 50 mcg (2,000 unit) capsule 2 times daily    levonorgestrel (Mirena) 21 mcg/24 hours (8 yrs) 52 mg IUD by intrauterine route.    multivitamin capsule Take by mouth.    ondansetron (ZOFRAN) 4 mg, oral, Every 8 hours PRN     "SUMAtriptan (IMITREX) 50 mg, oral, Once as needed, May repeat after 2 hours.    topiramate (TOPAMAX) 25 mg, oral, Daily      Vitals  BP Readings from Last 2 Encounters:   03/20/25 112/78   10/29/24 120/82     BMI Readings from Last 1 Encounters:   03/20/25 19.30 kg/m²      Labs  A1C  Lab Results   Component Value Date    HGBA1C 5.1 03/20/2025     BMP  Lab Results   Component Value Date    CALCIUM 9.6 03/20/2025     03/20/2025    K 4.2 03/20/2025    CO2 26 03/20/2025     03/20/2025    BUN 14 03/20/2025    CREATININE 0.88 03/20/2025    EGFR 87 03/20/2025     LFTs  Lab Results   Component Value Date    ALT 11 03/20/2025    AST 12 03/20/2025    ALKPHOS 68 03/20/2025    BILITOT 0.4 03/20/2025     FLP  Lab Results   Component Value Date    TRIG 120 03/20/2025    CHOL 218 (H) 03/20/2025    LDLCALC 137 (H) 03/20/2025    HDL 57 03/20/2025     Urine Microalbumin  No results found for: \"MICROALBCREA\"  Weight Management  Wt Readings from Last 3 Encounters:   03/20/25 47.1 kg (103 lb 12.8 oz)   10/29/24 50.3 kg (111 lb)   09/25/24 49.9 kg (110 lb)      There is no height or weight on file to calculate BMI.     Assessment/Plan   Problem List Items Addressed This Visit       ADD (attention deficit disorder)   The following medication access issues were identified and addressed:  Medication: Vyvanse  Action Plan: No issues found.     Monitoring and Education:  Counseled patient on MOA, expectations, duration of therapy, contraindications, administration, and monitoring parameters.  Answered all patient questions and concerns.     Clinical Pharmacist follow-up: as needed, Telehealth visit    Patient is not followed in CCM.    Continue all meds under the continuation of care with the referring provider and clinical pharmacy team.    Thank you,  Natalee Dietrich  Clinical Pharmacy Specialist, Primary Care  Phone: [phone]  Fax: [phone]    Verbal consent to manage patient's drug therapy was obtained from the " patient. They were informed they may decline to participate or withdraw from participation in pharmacy services at any time.

## 2025-04-08 ENCOUNTER — TELEMEDICINE (OUTPATIENT)
Dept: PRIMARY CARE | Facility: CLINIC | Age: 35
End: 2025-04-08
Payer: COMMERCIAL

## 2025-04-08 VITALS — BODY MASS INDEX: 18.83 KG/M2 | WEIGHT: 101.3 LBS

## 2025-04-08 DIAGNOSIS — F90.0 ATTENTION DEFICIT HYPERACTIVITY DISORDER (ADHD), PREDOMINANTLY INATTENTIVE TYPE: Primary | ICD-10-CM

## 2025-04-08 PROCEDURE — 1036F TOBACCO NON-USER: CPT | Performed by: STUDENT IN AN ORGANIZED HEALTH CARE EDUCATION/TRAINING PROGRAM

## 2025-04-08 PROCEDURE — 99213 OFFICE O/P EST LOW 20 MIN: CPT | Performed by: STUDENT IN AN ORGANIZED HEALTH CARE EDUCATION/TRAINING PROGRAM

## 2025-04-08 RX ORDER — LISDEXAMFETAMINE DIMESYLATE 20 MG/1
20 CAPSULE ORAL EVERY MORNING
Qty: 30 CAPSULE | Refills: 0 | Status: SHIPPED | OUTPATIENT
Start: 2025-06-07 | End: 2025-07-07

## 2025-04-08 RX ORDER — LISDEXAMFETAMINE DIMESYLATE 20 MG/1
20 CAPSULE ORAL EVERY MORNING
Qty: 30 CAPSULE | Refills: 0 | Status: SHIPPED | OUTPATIENT
Start: 2025-04-08 | End: 2025-05-08

## 2025-04-08 RX ORDER — LISDEXAMFETAMINE DIMESYLATE 20 MG/1
20 CAPSULE ORAL EVERY MORNING
Qty: 30 CAPSULE | Refills: 0 | Status: SHIPPED | OUTPATIENT
Start: 2025-05-08 | End: 2025-06-07

## 2025-04-08 ASSESSMENT — PATIENT HEALTH QUESTIONNAIRE - PHQ9
2. FEELING DOWN, DEPRESSED OR HOPELESS: SEVERAL DAYS
SUM OF ALL RESPONSES TO PHQ9 QUESTIONS 1 & 2: 1
1. LITTLE INTEREST OR PLEASURE IN DOING THINGS: NOT AT ALL

## 2025-04-08 ASSESSMENT — COLUMBIA-SUICIDE SEVERITY RATING SCALE - C-SSRS
2. HAVE YOU ACTUALLY HAD ANY THOUGHTS OF KILLING YOURSELF?: NO
1. IN THE PAST MONTH, HAVE YOU WISHED YOU WERE DEAD OR WISHED YOU COULD GO TO SLEEP AND NOT WAKE UP?: NO
6. HAVE YOU EVER DONE ANYTHING, STARTED TO DO ANYTHING, OR PREPARED TO DO ANYTHING TO END YOUR LIFE?: NO

## 2025-04-08 ASSESSMENT — PAIN SCALES - GENERAL: PAINLEVEL_OUTOF10: 0-NO PAIN

## 2025-04-08 ASSESSMENT — ENCOUNTER SYMPTOMS
DEPRESSION: 0
OCCASIONAL FEELINGS OF UNSTEADINESS: 0
LOSS OF SENSATION IN FEET: 0

## 2025-04-08 NOTE — ASSESSMENT & PLAN NOTE
Chronic, a few side effects with the adderall extended release   Open to trial of vyvanse  Will start at 20 and titrate at follow-up as tolerates  Controlled medication contract reviewed with verbal consent, signed and to be sent via [employer] to patient as well  Understands to complete urine drug screening as part of agreement  Short term follow-up to review response  Pdmp reviewed, concordant  Orders:    lisdexamfetamine (Vyvanse) 20 mg capsule; Take 1 capsule (20 mg) by mouth once daily in the morning.    lisdexamfetamine (Vyvanse) 20 mg capsule; Take 1 capsule (20 mg) by mouth once daily in the morning. Do not fill before May 8, 2025.    lisdexamfetamine (Vyvanse) 20 mg capsule; Take 1 capsule (20 mg) by mouth once daily in the morning. Do not fill before June 7, 2025.

## 2025-04-08 NOTE — PROGRESS NOTES
OUTPATIENT VISIT - [place]   Virtual or Telephone Consent    An interactive audio and video telecommunication system which permits real time communications between the patient (at the originating site) and provider (at the distant site) was utilized to provide this telehealth service.   Verbal consent was requested and obtained from Yumi Richard on this date, 04/08/25 for a telehealth visit and the patient's location was confirmed at the time of the visit.    Subjective   Patient ID: Yumi Richard is a 35 y.o. female who presents for Follow-up.    HPI  HPI     Diagnosed with adhd in the third grade. Was on ritilan, did not tolerate. Was on another medication that made her feel like a robot. Was on adderall end of 4th grade through college. Then on the extended release which she did well on. When graduated was told she doesn't need to be on it since she's not in school. Tried wellbutrin which helped her depression but did not help her adhd all the way. Strattera caused side effects - dry mouth and hot flashes. One of her close friends is on vyvanse and really likes it. The current adderall 10mg ER does at times make her feel a little anxious, sometimes affects her sleep at night. Has never been on vyvanse before. Open to signing the controlled substance agreement and completing a UDS.    Objective     ROS  Review of Systems  All pertinent positive symptoms are included in the history of present illness.  All other systems have been reviewed and are negative and noncontributory to this patient's current ailments.    PHQ9/GAD7:  Patient Health Questionnaire-2 Score: 1 (4/8/2025 11:17 AM)   No data recorded   No data recorded     Current Medications  Current Outpatient Medications   Medication Instructions    5-hydroxytryptophan, 5-HTP, (5-HTP ORAL) Take by mouth.    amphetamine-dextroamphetamine XR (Adderall XR) 10 mg 24 hr capsule Take 1 tablet by mouth every morning for 1 week then increase to 2 tablets by  mouth every morning. Do not crush or chew.    ascorbic acid (VITAMIN C) 1,000 mg, Daily    buPROPion XL (WELLBUTRIN XL) 300 mg, oral, Daily, Take with 150 mg for 450 mg total    buPROPion XL (WELLBUTRIN XL) 150 mg, oral, Daily, Add to the 300 mg medication for 450mg total    cholecalciferol (Vitamin D-3) 50 mcg (2,000 unit) capsule 2 times daily    levonorgestrel (Mirena) 21 mcg/24 hours (8 yrs) 52 mg IUD by intrauterine route.    multivitamin capsule Take by mouth.    ondansetron (ZOFRAN) 4 mg, oral, Every 8 hours PRN    SUMAtriptan (IMITREX) 50 mg, oral, Once as needed, May repeat after 2 hours.    topiramate (TOPAMAX) 25 mg, oral, Daily        Allergies  Allergies   Allergen Reactions    Oxycodone-Acetaminophen Other    Iodine Rash        VITAL SIGNS  There were no vitals filed for this visit.  Vitals:    04/08/25 1115   Weight: 45.9 kg (101 lb 4.8 oz)      Body mass index is 18.83 kg/m².     Physical Exam  Constitutional:       General: She is not in acute distress.     Appearance: Normal appearance.   Pulmonary:      Effort: Pulmonary effort is normal. No respiratory distress.   Neurological:      General: No focal deficit present.      Mental Status: She is alert and oriented to person, place, and time.   Psychiatric:         Mood and Affect: Mood normal.         Thought Content: Thought content normal.         Assessment/Plan   Assessment & Plan  Attention deficit hyperactivity disorder (ADHD), predominantly inattentive type    Chronic, a few side effects with the adderall extended release   Open to trial of vyvanse  Will start at 20 and titrate at follow-up as tolerates  Controlled medication contract reviewed with verbal consent, signed and to be sent via [employer] to patient as well  Understands to complete urine drug screening as part of agreement  Short term follow-up to review response  Pdmp reviewed, concordant  Orders:    lisdexamfetamine (Vyvanse) 20 mg capsule; Take 1 capsule (20 mg) by mouth once  daily in the morning.    lisdexamfetamine (Vyvanse) 20 mg capsule; Take 1 capsule (20 mg) by mouth once daily in the morning. Do not fill before May 8, 2025.    lisdexamfetamine (Vyvanse) 20 mg capsule; Take 1 capsule (20 mg) by mouth once daily in the morning. Do not fill before June 7, 2025.        Counseling:       Medication education:           Education:  The patient is counseled regarding potential side-effects of all new medications          Understanding:  Patient expressed understanding of information conveyed today          Adherence:  No barriers to adherence identified     Follow-up: a few weeks to monitor response to vyvanse, to please complete UDS and controlled medication agreement    ** Please excuse any errors in grammar or translation related to this dictation. Voice recognition software was utilized to prepare this document. **          Nelda Huffman MD   04/08/25   12:04 PM

## 2025-04-10 ENCOUNTER — APPOINTMENT (OUTPATIENT)
Dept: PRIMARY CARE | Facility: CLINIC | Age: 35
End: 2025-04-10
Payer: COMMERCIAL

## 2025-04-15 ENCOUNTER — TELEMEDICINE (OUTPATIENT)
Dept: PRIMARY CARE | Facility: CLINIC | Age: 35
End: 2025-04-15
Payer: COMMERCIAL

## 2025-04-15 DIAGNOSIS — F90.0 ATTENTION DEFICIT HYPERACTIVITY DISORDER (ADHD), PREDOMINANTLY INATTENTIVE TYPE: Primary | ICD-10-CM

## 2025-04-15 NOTE — ASSESSMENT & PLAN NOTE
Some response to vyvanse 20, will double to evaluate how she does with larger dose  Short term follow-up to reassess symptoms in 1 week  Controlled medication agreement signed, UDS ordered

## 2025-04-15 NOTE — PROGRESS NOTES
OUTPATIENT VISIT - [place]     Virtual or Telephone Consent    While technically available, we performed this visit with audio applications;I performed this visit using a real-time audio only connection between Yumi Richard & Nelda Huffman MD.  Verbal consent was requested and obtained from Yumi Richard on this date, 04/15/25 for a telehealth visit and the patient's location was confirmed at the time of the visit.      Subjective   Patient ID: Yumi Richard is a 35 y.o. female who presents for No chief complaint on file..    HPI  HPI     Started the vyvanse 20. Giving her some energy. At its peak feels motivation to do more things. Feels it is maybe too low a dose. Lasting maybe 2 to 3 hours after initial release time. Days on her adderall through work was feeling productive, felt more organized and focused. With the vyanse, while still moving through the day, still feels disorganized, can't get her ducks in a row.     Objective     ROS  Review of Systems  All pertinent positive symptoms are included in the history of present illness.  All other systems have been reviewed and are negative and noncontributory to this patient's current ailments.    PHQ9/GAD7:  No data recorded   No data recorded   No data recorded     Current Medications  Current Outpatient Medications   Medication Instructions    5-hydroxytryptophan, 5-HTP, (5-HTP ORAL) Take by mouth.    amphetamine-dextroamphetamine XR (Adderall XR) 10 mg 24 hr capsule Take 1 tablet by mouth every morning for 1 week then increase to 2 tablets by mouth every morning. Do not crush or chew.    ascorbic acid (VITAMIN C) 1,000 mg, Daily    buPROPion XL (WELLBUTRIN XL) 300 mg, oral, Daily, Take with 150 mg for 450 mg total    buPROPion XL (WELLBUTRIN XL) 150 mg, oral, Daily, Add to the 300 mg medication for 450mg total    cholecalciferol (Vitamin D-3) 50 mcg (2,000 unit) capsule 2 times daily    levonorgestrel (Mirena) 21 mcg/24 hours (8 yrs) 52 mg IUD by  intrauterine route.    lisdexamfetamine (VYVANSE) 20 mg, oral, Every morning    [START ON 5/8/2025] lisdexamfetamine (VYVANSE) 20 mg, oral, Every morning    [START ON 6/7/2025] lisdexamfetamine (VYVANSE) 20 mg, oral, Every morning    multivitamin capsule Take by mouth.    ondansetron (ZOFRAN) 4 mg, oral, Every 8 hours PRN    SUMAtriptan (IMITREX) 50 mg, oral, Once as needed, May repeat after 2 hours.    topiramate (TOPAMAX) 25 mg, oral, Daily        Allergies  Allergies   Allergen Reactions    Oxycodone-Acetaminophen Other    Iodine Rash        VITAL SIGNS  There were no vitals filed for this visit.  There were no vitals filed for this visit.   There is no height or weight on file to calculate BMI.     Physical Exam  Pulmonary:      Effort: Pulmonary effort is normal. No respiratory distress.   Neurological:      General: No focal deficit present.      Mental Status: She is oriented to person, place, and time.      Cranial Nerves: No cranial nerve deficit.         Assessment/Plan   Assessment & Plan  Attention deficit hyperactivity disorder (ADHD), predominantly inattentive type  Some response to vyvanse 20, will double to evaluate how she does with larger dose  Short term follow-up to reassess symptoms in 1 week  Controlled medication agreement signed, UDS ordered             Counseling:       Medication education:           Education:  The patient is counseled regarding potential side-effects of all new medications          Understanding:  Patient expressed understanding of information conveyed today          Adherence:  No barriers to adherence identified     Follow-up: 1 week follow-up     ** Please excuse any errors in grammar or translation related to this dictation. Voice recognition software was utilized to prepare this document. **          Nelda Huffman MD   04/15/25   9:03 AM

## 2025-04-17 ENCOUNTER — TELEPHONE (OUTPATIENT)
Dept: PRIMARY CARE | Facility: CLINIC | Age: 35
End: 2025-04-17
Payer: COMMERCIAL

## 2025-04-17 ENCOUNTER — APPOINTMENT (OUTPATIENT)
Dept: PRIMARY CARE | Facility: CLINIC | Age: 35
End: 2025-04-17
Payer: COMMERCIAL

## 2025-04-17 NOTE — TELEPHONE ENCOUNTER
Pt is asking if you can r/s her Tues Virtual appt for Thurs morning, she's aware we don't have any availability at this time, if we do have have any appt she'll make Tues dday work.

## 2025-04-22 ENCOUNTER — APPOINTMENT (OUTPATIENT)
Dept: PRIMARY CARE | Facility: CLINIC | Age: 35
End: 2025-04-22
Payer: COMMERCIAL

## 2025-04-23 ENCOUNTER — APPOINTMENT (OUTPATIENT)
Dept: INTEGRATIVE MEDICINE | Facility: CLINIC | Age: 35
End: 2025-04-23
Payer: COMMERCIAL

## 2025-04-23 DIAGNOSIS — M99.04 SEGMENTAL AND SOMATIC DYSFUNCTION OF SACRAL REGION: ICD-10-CM

## 2025-04-23 DIAGNOSIS — M99.02 SEGMENTAL AND SOMATIC DYSFUNCTION OF THORACIC REGION: ICD-10-CM

## 2025-04-23 DIAGNOSIS — M99.01 SEGMENTAL AND SOMATIC DYSFUNCTION OF CERVICAL REGION: Primary | ICD-10-CM

## 2025-04-23 DIAGNOSIS — M54.2 CERVICALGIA: ICD-10-CM

## 2025-04-23 DIAGNOSIS — M79.10 MYALGIA: ICD-10-CM

## 2025-04-23 DIAGNOSIS — M79.9 POSTURAL STRAIN: ICD-10-CM

## 2025-04-23 DIAGNOSIS — M99.03 SEGMENTAL AND SOMATIC DYSFUNCTION OF LUMBAR REGION: ICD-10-CM

## 2025-04-23 DIAGNOSIS — M54.59 MECHANICAL LOW BACK PAIN: ICD-10-CM

## 2025-04-23 DIAGNOSIS — G44.86 CERVICOGENIC HEADACHE: ICD-10-CM

## 2025-04-23 PROCEDURE — MASS60E MASSAGE 60 MINUTES EMPLOYEE RATE

## 2025-04-23 PROCEDURE — 97112 NEUROMUSCULAR REEDUCATION: CPT | Performed by: CHIROPRACTOR

## 2025-04-23 PROCEDURE — 98941 CHIROPRACT MANJ 3-4 REGIONS: CPT | Performed by: CHIROPRACTOR

## 2025-04-23 NOTE — PROGRESS NOTES
Subjective   Patient ID: Yumi Richard is a 35 y.o. female who presents April 23, 2025 for neck pain, headaches and low back pain.    (5/20) VPCY    Today, the patient rates their degree of pain as a 4 out of 10 on the numeric pain rating scale.     Yumi returns for continued treatment of neck pain, headaches and low back pain. Patient states that she is okay. She states that work has been very busy lately and that has increased her full body tension, neck more than low back. She states that she developed season allergies this year and that has cause some mild head pain, but not migraines. Denies any other associated symptoms or change in medical history since last visit.   ____________________________________________________  Initial Exam:  Yumi presents for evaluation and treatment of neck pain, headaches and low back pain. Patient states that she has a long history of headaches. She states that she gets migraines, tension type headaches and sinus headaches. She states that migraines have recently been helped with topamax and have been reduced to twice a month. She states that tension type headaches are more frequent, 4-5 times a week. She states that neck pain starts at the base of the skull and goes down to the top of the shoulder, R>L. She states that screen time, looking down, and stress increase symptoms while ibuprofen, heat/ice, massage, stretching help reduce symptoms. She states that low back pain began about 2 months ago without incident. She states that pain goes across the low back and into the buttock, L>R. She states that she occasionally has a pinching sensation in her left glute while walking. She states that bending and lifting increase symptoms, while heat and rest help decrease symptoms. Patient denies any  difficulty speaking/swallowing, vision changes, bowel/bladder issues, chest pain/shortness of breath, numbness/tingling. Patient is new to chiropractic care.            Objective    Physical Exam  Constitutional:       General: She is not in acute distress.     Appearance: Normal appearance.       HENT:      Head: Normocephalic and atraumatic.   Musculoskeletal:      Cervical back: Tenderness present. Pain with movement present. Decreased range of motion.      Thoracic back: Tenderness present.      Lumbar back: Tenderness present. Decreased range of motion.   Neurological:      General: No focal deficit present.      Mental Status: She is alert and oriented to person, place, and time.      Cranial Nerves: No dysarthria or facial asymmetry.      Sensory: Sensation is intact.      Motor: Motor function is intact.      Coordination: Coordination is intact.      Gait: Gait is intact.   Psychiatric:         Attention and Perception: Attention normal.         Mood and Affect: Mood normal.         Speech: Speech normal.         Behavior: Behavior is cooperative.       Palpation of the following regions revealed:  Cervical: Suboccipitals bilateral, hypertonicity and tenderness.  Upper trapezius bilateral, hypertonicity and tenderness.  Levator scapulae bilateral, hypertonicity and tenderness.  Cervical paraspinals bilateral, hypertonicity and tenderness.  Thoracic: Thoracic paraspinals bilateral, hypertonicity and tenderness.  Middle trapezius bilateral, hypertonicity and tenderness.  Pectoralis bilateral, hypertonicity and tenderness.  Lumbar: Lumbar paraspinals bilateral, hypertonicity and tenderness.  Gluteal bilateral, hypertonicity and tenderness.    Segmental Joint(s): Segmental joint dysfunction was assessed with motion palpation and is identified in the following areas:  Cervical : C0 C1 C5 C6 C7  Thoracic : T2., T3, T6, T7, and T10  Lumbopelvic / Sacral SIJ : L1 and L5/S1  Upper Extremities : R Glenohumeral joint and L Glenohumeral joint  Lower Extremities : R Hip and L Hip    Assessment/Plan   Today's Treatment Included: Spinal manipulation to the following regions of segmental dysfunction  identified on examination, using age-appropriate and injury specific force. Segmental Joint(s) Cervical : C0 C1 C5 C6 C7 Segmental Joint(s) Thoracic : T2., T3, T6, T7, and T10 Segmental Joint(s) Lumbopelvic/Sacral SIJ : L1 and L5/S1  Extremity manipulation performed to the following regions: Upper Extremities : R Glenohumeral joint and L Glenohumeral joint Lower Extremities : R Hip and L Hip  Chiropractic manipulation treatment techniques utilized: Diversified CMT, Cervical Manual Traction, and Long-Axis Traction   Soft tissue mobilization techniques utilized during treatment: Pin and Stretch, Cupping, and Ischemic compression  Neuromuscular Re-Education (44418): 2 Units; Start time: 11:25 am  End time: 11:50 am      Soft-tissue mobilization was performed in the following areas:  Suboccipital bilateral, Cervical paraspinal mm. bilateral, Upper Trapezius bilateral, and Levator Scap. bilateral  Thoracic Paraspinal mm. bilateral, Middle Trapezius bilateral, and Pectoralis bilateral  Lumbar Paraspinal mm. bilateral and Gluteal mm.  bilateral    Recommended follow-up in 3 week(s).     The patient tolerated today's treatment with little or no additional discomfort and was instructed to contact the office for questions or concerns.

## 2025-04-24 ENCOUNTER — TELEPHONE (OUTPATIENT)
Dept: PRIMARY CARE | Facility: CLINIC | Age: 35
End: 2025-04-24

## 2025-04-24 ENCOUNTER — TELEMEDICINE (OUTPATIENT)
Dept: PRIMARY CARE | Facility: CLINIC | Age: 35
End: 2025-04-24
Payer: COMMERCIAL

## 2025-04-24 DIAGNOSIS — R09.81 SINUS CONGESTION: Primary | ICD-10-CM

## 2025-04-24 DIAGNOSIS — F90.0 ATTENTION DEFICIT HYPERACTIVITY DISORDER (ADHD), PREDOMINANTLY INATTENTIVE TYPE: ICD-10-CM

## 2025-04-24 DIAGNOSIS — D75.839 THROMBOCYTOSIS: ICD-10-CM

## 2025-04-24 PROCEDURE — 99214 OFFICE O/P EST MOD 30 MIN: CPT | Performed by: STUDENT IN AN ORGANIZED HEALTH CARE EDUCATION/TRAINING PROGRAM

## 2025-04-24 RX ORDER — LISDEXAMFETAMINE DIMESYLATE 50 MG/1
50 CAPSULE ORAL EVERY MORNING
Qty: 30 CAPSULE | Refills: 0 | Status: SHIPPED | OUTPATIENT
Start: 2025-04-24 | End: 2025-05-24

## 2025-04-24 RX ORDER — CETIRIZINE HYDROCHLORIDE 10 MG/1
10 TABLET ORAL NIGHTLY PRN
Qty: 90 TABLET | Refills: 3 | Status: SHIPPED | OUTPATIENT
Start: 2025-04-24

## 2025-04-24 NOTE — PROGRESS NOTES
Massage Therapy Visit:     Yumi Richard     Condition of Client Subjective :  Patient ID: Yumi Richard is a 35 y.o. 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 Moderate pressure upper body massage, MFR throughout scalp/face, palpations knuckle strokes stripping pin/stretch cross fiber frictions and ischemic pressure throughout scalp face upper traps levator scapulas scalenes omohyoids sternocleidomastoids supraspinatus  occipital attachments and splenis capitis/cervicis, pin/breath knuckle strokes throughout pectoralis major/minor, pétrissage stripping knuckle strokes cross fiber frictions and compressions throughout trapezius rhomboids erector spinae infraspinatus quadratus lumborum latissimus dorsi obliques         Session Information  Visit Type: Follow-up visit  Description of present complaint: Stress, Discomfort, Headache, Muscle tension, Range of motion (ROM), Myofascial pain, Chronic pain, Gait issues, Fatigue      Review of Systems    Objective   Pre-treatment Assessment  Arrival Mode: Ambulatory  Treatment Precautions: None    Physical Exam    Actions Assessment/Plan :  Provider reviewed plan for the massage session, precautions and contraindications. Patient/guardian/hospital staff has given consent to treat with full understanding of what to expect during the session. Before massage therapy began, provider explained to the patient to communicate at any time if the pressure was causing discomfort past their tolerance level. Patient agreed to advise therapist.    Massage  Treatment  Denies Allergy: Patient denies allergy to topical lubricant  Patient Position: Table, Prone, Supine  Positioning Assistance: Did not need assistance, Pillow(s)/bolster under knees while supine, Pillow(s)/bolster under anles while prone  Pressure Scale: 4 - Moderate pressure    Response:  Post-treatment Assessment  Patient Fell Asleep During Treatment: No  Patient Noted Improvement of the Following Symptoms: Muscle tension, ROM    Evaluation:

## 2025-04-24 NOTE — ASSESSMENT & PLAN NOTE
Improved motivation with increase in vyvanse to 40  Continues to struggle with organization, mild insomnia managed with melatonin  Seeing benefit from vyvanse over adderall  Interested in slight increase to 50mg daily - will reassess in 4 weeks  Supportive counseling and active listening provided  If continues to struggle with disorganization and insomnia, could consider weaning down to 40 again with prn adderall?  Consent in place for stimulant, to still complete UDS    Orders:    lisdexamfetamine (Vyvanse) 50 mg capsule; Take 1 capsule (50 mg) by mouth once daily in the morning.

## 2025-04-24 NOTE — ASSESSMENT & PLAN NOTE
Noted with last labs, might be inflammatory component  Plan to repeat in future with next labs

## 2025-04-24 NOTE — PROGRESS NOTES
OUTPATIENT VISIT - [place]     Virtual or Telephone Consent    An interactive audio and video telecommunication system which permits real time communications between the patient (at the originating site) and provider (at the distant site) was utilized to provide this telehealth service.   Verbal consent was requested and obtained from Yumi Richard on this date, 04/24/25 for a telehealth visit and the patient's location was confirmed at the time of the visit.      Subjective   Patient ID: Yumi Richard is a 35 y.o. female who presents for Med Refill.    HPI  HPI     Sinus congestion/pressure - allergies with the season. Used to use sudafed but then once she started the stimulants with the adhd up till 3AM. Mostly drinks tea to clear things out. When at work the air filters work well. Ice packs. Once tried allergra but also with the stimulants. Has been on and off for the past week (not even full week).    Adhd- has felt more motivated with the vyanse. Not yet feeling very organized but feels like she can figure this out. Feels a little disorganized though. Has been doubling up on the 20s (40mg) as discussed at last visit. Has to make herself go to sleep, otherwise feels like she can keep going. Having to take melatonin 5-10mg at night to feel sleepy. Taking the vyvanse no later than 6:30am - keeping it there so that she takes it first thing. Not feeling the crash that she used to feel with adderall. Likes that there isn't a delay in her personality - isn't a moment where she cannot say what is in her brain. Feels more like herself on the vyvanse. Does not want to give it up just yet. One day at work where not too much going on and couldn't figure out how to prioritize what to do - did have some anxiety with this.     Objective     ROS  Review of Systems  All pertinent positive symptoms are included in the history of present illness.  All other systems have been reviewed and are negative and noncontributory to  this patient's current ailments.    PHQ9/GAD7:  No data recorded   No data recorded   No data recorded     Current Medications  Current Outpatient Medications   Medication Instructions    5-hydroxytryptophan, 5-HTP, (5-HTP ORAL) Take by mouth.    amphetamine-dextroamphetamine XR (Adderall XR) 10 mg 24 hr capsule Take 1 tablet by mouth every morning for 1 week then increase to 2 tablets by mouth every morning. Do not crush or chew.    ascorbic acid (VITAMIN C) 1,000 mg, Daily    buPROPion XL (WELLBUTRIN XL) 300 mg, oral, Daily, Take with 150 mg for 450 mg total    buPROPion XL (WELLBUTRIN XL) 150 mg, oral, Daily, Add to the 300 mg medication for 450mg total    cetirizine (ZYRTEC) 10 mg, oral, Nightly PRN    cholecalciferol (Vitamin D-3) 50 mcg (2,000 unit) capsule 2 times daily    levonorgestrel (Mirena) 21 mcg/24 hours (8 yrs) 52 mg IUD by intrauterine route.    lisdexamfetamine (VYVANSE) 50 mg, oral, Every morning    multivitamin capsule Take by mouth.    ondansetron (ZOFRAN) 4 mg, oral, Every 8 hours PRN    SUMAtriptan (IMITREX) 50 mg, oral, Once as needed, May repeat after 2 hours.    topiramate (TOPAMAX) 25 mg, oral, Daily        Allergies  Allergies[1]     VITAL SIGNS  There were no vitals filed for this visit.  There were no vitals filed for this visit.   There is no height or weight on file to calculate BMI.     Physical Exam  Constitutional:       General: She is not in acute distress.     Appearance: Normal appearance.   Pulmonary:      Effort: Pulmonary effort is normal. No respiratory distress.   Neurological:      Mental Status: She is alert.   Psychiatric:         Mood and Affect: Mood normal.         Thought Content: Thought content normal.         Assessment/Plan   Assessment & Plan  Sinus congestion  Acute x <1 week, likely seasonal allergies vs viral  Recommend nasal spray - to  flonase OTC  Open to trial of zyrtec 10 - take 1 tablet nightly prn symptoms  If symptoms persist >7-10 days, to  notify me and will consider antibiotic    Orders:    cetirizine (ZyrTEC) 10 mg tablet; Take 1 tablet (10 mg) by mouth as needed at bedtime for allergies (sinus pressure).    Attention deficit hyperactivity disorder (ADHD), predominantly inattentive type  Improved motivation with increase in vyvanse to 40  Continues to struggle with organization, mild insomnia managed with melatonin  Seeing benefit from vyvanse over adderall  Interested in slight increase to 50mg daily - will reassess in 4 weeks  Supportive counseling and active listening provided  If continues to struggle with disorganization and insomnia, could consider weaning down to 40 again with prn adderall?  Consent in place for stimulant, to still complete UDS    Orders:    lisdexamfetamine (Vyvanse) 50 mg capsule; Take 1 capsule (50 mg) by mouth once daily in the morning.    Thrombocytosis  Noted with last labs, might be inflammatory component  Plan to repeat in future with next labs           Counseling:       Medication education:           Education:  The patient is counseled regarding potential side-effects of all new medications          Understanding:  Patient expressed understanding of information conveyed today          Adherence:  No barriers to adherence identified     Follow-up: 4 weeks medication follow-up    ** Please excuse any errors in grammar or translation related to this dictation. Voice recognition software was utilized to prepare this document. **          Nelda Huffman MD   04/24/25   10:55 AM          [1]   Allergies  Allergen Reactions    Oxycodone-Acetaminophen Other    Iodine Rash

## 2025-04-24 NOTE — TELEPHONE ENCOUNTER
I called and spoke with Yumi. She was added to the 's schedule on 05/29/25 for a virtual medication follow up.

## 2025-05-14 ENCOUNTER — APPOINTMENT (OUTPATIENT)
Dept: INTEGRATIVE MEDICINE | Facility: CLINIC | Age: 35
End: 2025-05-14
Payer: COMMERCIAL

## 2025-05-14 DIAGNOSIS — G44.86 CERVICOGENIC HEADACHE: ICD-10-CM

## 2025-05-14 DIAGNOSIS — M99.04 SEGMENTAL AND SOMATIC DYSFUNCTION OF SACRAL REGION: ICD-10-CM

## 2025-05-14 DIAGNOSIS — M99.01 SEGMENTAL AND SOMATIC DYSFUNCTION OF CERVICAL REGION: Primary | ICD-10-CM

## 2025-05-14 DIAGNOSIS — M79.9 POSTURAL STRAIN: ICD-10-CM

## 2025-05-14 DIAGNOSIS — M79.10 MYALGIA: ICD-10-CM

## 2025-05-14 DIAGNOSIS — M54.2 CERVICALGIA: ICD-10-CM

## 2025-05-14 DIAGNOSIS — M99.03 SEGMENTAL AND SOMATIC DYSFUNCTION OF LUMBAR REGION: ICD-10-CM

## 2025-05-14 DIAGNOSIS — M54.59 MECHANICAL LOW BACK PAIN: ICD-10-CM

## 2025-05-14 DIAGNOSIS — M99.02 SEGMENTAL AND SOMATIC DYSFUNCTION OF THORACIC REGION: ICD-10-CM

## 2025-05-14 PROCEDURE — MASS60E MASSAGE 60 MINUTES EMPLOYEE RATE

## 2025-05-14 PROCEDURE — 98941 CHIROPRACT MANJ 3-4 REGIONS: CPT | Performed by: CHIROPRACTOR

## 2025-05-14 PROCEDURE — 97112 NEUROMUSCULAR REEDUCATION: CPT | Performed by: CHIROPRACTOR

## 2025-05-14 NOTE — PROGRESS NOTES
Subjective   Patient ID: Yumi Richard is a 35 y.o. female who presents May 14, 2025 for neck pain, headaches and low back pain.    (6/20) VPCY    Today, the patient rates their degree of pain as a 3 out of 10 on the numeric pain rating scale.     Yumi returns for continued treatment of neck pain, headaches and low back pain. Patient states that she is well today. She states that she has been feeling good lately. She states that she has only had 2 headaches since last visit, one sinus related, and one last night after a stressful day at work. She states that she woke up without head pain this morning. Denies any other associated symptoms or change in medical history since last visit.   ____________________________________________________  Initial Exam:  Yumi presents for evaluation and treatment of neck pain, headaches and low back pain. Patient states that she has a long history of headaches. She states that she gets migraines, tension type headaches and sinus headaches. She states that migraines have recently been helped with topamax and have been reduced to twice a month. She states that tension type headaches are more frequent, 4-5 times a week. She states that neck pain starts at the base of the skull and goes down to the top of the shoulder, R>L. She states that screen time, looking down, and stress increase symptoms while ibuprofen, heat/ice, massage, stretching help reduce symptoms. She states that low back pain began about 2 months ago without incident. She states that pain goes across the low back and into the buttock, L>R. She states that she occasionally has a pinching sensation in her left glute while walking. She states that bending and lifting increase symptoms, while heat and rest help decrease symptoms. Patient denies any  difficulty speaking/swallowing, vision changes, bowel/bladder issues, chest pain/shortness of breath, numbness/tingling. Patient is new to chiropractic care.             Objective   Physical Exam  Constitutional:       General: She is not in acute distress.     Appearance: Normal appearance.       HENT:      Head: Normocephalic and atraumatic.   Musculoskeletal:      Cervical back: Tenderness present. Pain with movement present. Decreased range of motion.      Thoracic back: Tenderness present.      Lumbar back: Tenderness present. Decreased range of motion.   Neurological:      General: No focal deficit present.      Mental Status: She is alert and oriented to person, place, and time.      Cranial Nerves: No dysarthria or facial asymmetry.      Sensory: Sensation is intact.      Motor: Motor function is intact.      Coordination: Coordination is intact.      Gait: Gait is intact.   Psychiatric:         Attention and Perception: Attention normal.         Mood and Affect: Mood normal.         Speech: Speech normal.         Behavior: Behavior is cooperative.       Palpation of the following regions revealed:  Cervical: Suboccipitals bilateral, hypertonicity and tenderness.  Upper trapezius bilateral, hypertonicity and tenderness.  Levator scapulae bilateral, hypertonicity and tenderness.  Cervical paraspinals bilateral, hypertonicity and tenderness.  Thoracic: Thoracic paraspinals bilateral, hypertonicity and tenderness.  Middle trapezius bilateral, hypertonicity and tenderness.  Pectoralis bilateral, hypertonicity and tenderness.  Lumbar: Lumbar paraspinals bilateral, hypertonicity and tenderness.  Gluteal bilateral, hypertonicity and tenderness.    Segmental Joint(s): Segmental joint dysfunction was assessed with motion palpation and is identified in the following areas:  Cervical : C0 C1 C5 C6 C7  Thoracic : T2., T3, T6, T7, and T10  Lumbopelvic / Sacral SIJ : L1 and L5/S1  Upper Extremities : R Glenohumeral joint and L Glenohumeral joint  Lower Extremities : R Hip and L Hip    Assessment/Plan   Today's Treatment Included: Spinal manipulation to the following regions of  segmental dysfunction identified on examination, using age-appropriate and injury specific force. Segmental Joint(s) Cervical : C0 C1 C5 C6 C7 Segmental Joint(s) Thoracic : T2., T3, T6, T7, and T10 Segmental Joint(s) Lumbopelvic/Sacral SIJ : L1 and L5/S1  Extremity manipulation performed to the following regions: Upper Extremities : R Glenohumeral joint and L Glenohumeral joint Lower Extremities : R Hip and L Hip  Chiropractic manipulation treatment techniques utilized: Diversified CMT, Cervical Manual Traction, and Long-Axis Traction   Soft tissue mobilization techniques utilized during treatment: Pin and Stretch, Cupping, and Ischemic compression  Neuromuscular Re-Education (23111): 1 Units; Start time: 10:45 am  End time: 10:55 am      Soft-tissue mobilization was performed in the following areas:  Suboccipital bilateral, Cervical paraspinal mm. bilateral, Upper Trapezius bilateral, and Levator Scap. bilateral  Thoracic Paraspinal mm. bilateral, Middle Trapezius bilateral, and Pectoralis bilateral  Lumbar Paraspinal mm. bilateral and Gluteal mm.  bilateral    Recommended follow-up in 3 week(s).     The patient tolerated today's treatment with little or no additional discomfort and was instructed to contact the office for questions or concerns.

## 2025-05-14 NOTE — PROGRESS NOTES
Massage Therapy Visit:     Yumi Richard     Condition of Client Subjective :  Patient ID: Yumi Richard is a 35 y.o. 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 Moderate pressure upper body massage, MFR throughout scalp/face, palpations knuckle strokes stripping pin/stretch cross fiber frictions and ischemic pressure throughout scalp face upper traps levator scapulas scalenes omohyoids sternocleidomastoids supraspinatus  occipital attachments and splenis capitis/cervicis, pin/breath knuckle strokes throughout pectoralis major/minor, pétrissage stripping knuckle strokes cross fiber frictions and compressions throughout trapezius rhomboids erector spinae infraspinatus quadratus lumborum latissimus dorsi obliques   palpations knuckle strokes stripping effleurage pin/stretch and cross fiber frictions throughout quadriceps IT band TFL hamstrings         Session Information  Visit Type: Follow-up visit  Description of present complaint: Stress, Muscle tension, Discomfort, Range of motion (ROM), Myofascial pain, Fatigue, Chronic pain, Gait issues      Review of Systems    Objective   Pre-treatment Assessment  Arrival Mode: Ambulatory  Treatment Precautions: None    Physical Exam    Actions Assessment/Plan :  Provider reviewed plan for the massage session, precautions and contraindications. Patient/guardian/hospital staff has given consent to treat with full understanding of what to expect during the session. Before massage  therapy began, provider explained to the patient to communicate at any time if the pressure was causing discomfort past their tolerance level. Patient agreed to advise therapist.    Massage Treatment  Denies Allergy: Patient denies allergy to topical lubricant  Patient Position: Table, Prone, Supine  Positioning Assistance: Pillow(s)/bolster under knees while supine, Did not need assistance, Pillow(s)/bolster under anles while prone  Pressure Scale: 4 - Moderate pressure    Response:  Post-treatment Assessment  Patient Fell Asleep During Treatment: No  Patient Noted Improvement of the Following Symptoms: Muscle tension, ROM    Evaluation:

## 2025-05-29 ENCOUNTER — TELEMEDICINE (OUTPATIENT)
Dept: PRIMARY CARE | Facility: CLINIC | Age: 35
End: 2025-05-29
Payer: COMMERCIAL

## 2025-05-29 DIAGNOSIS — D75.839 THROMBOCYTOSIS: ICD-10-CM

## 2025-05-29 DIAGNOSIS — F90.0 ATTENTION DEFICIT HYPERACTIVITY DISORDER (ADHD), PREDOMINANTLY INATTENTIVE TYPE: Primary | ICD-10-CM

## 2025-05-29 PROCEDURE — 99213 OFFICE O/P EST LOW 20 MIN: CPT | Performed by: STUDENT IN AN ORGANIZED HEALTH CARE EDUCATION/TRAINING PROGRAM

## 2025-05-29 RX ORDER — LISDEXAMFETAMINE DIMESYLATE 50 MG/1
50 CAPSULE ORAL EVERY MORNING
Qty: 30 CAPSULE | Refills: 0 | Status: SHIPPED | OUTPATIENT
Start: 2025-07-24 | End: 2025-08-23

## 2025-05-29 RX ORDER — LISDEXAMFETAMINE DIMESYLATE 50 MG/1
50 CAPSULE ORAL EVERY MORNING
Qty: 30 CAPSULE | Refills: 0 | Status: SHIPPED | OUTPATIENT
Start: 2025-05-29 | End: 2025-06-28

## 2025-05-29 RX ORDER — LISDEXAMFETAMINE DIMESYLATE 50 MG/1
50 CAPSULE ORAL EVERY MORNING
Qty: 30 CAPSULE | Refills: 0 | Status: SHIPPED | OUTPATIENT
Start: 2025-06-26 | End: 2025-07-26

## 2025-05-29 NOTE — PROGRESS NOTES
OUTPATIENT VISIT - [place]   Virtual or Telephone Consent    An interactive audio and video telecommunication system which permits real time communications between the patient (at the originating site) and provider (at the distant site) was utilized to provide this telehealth service.   Verbal consent was requested and obtained from Yumi Richard on this date, 05/29/25 for a telehealth visit and the patient's location was confirmed at the time of the visit.    Subjective   Patient ID: Yumi Richard is a 35 y.o. female who presents for No chief complaint on file..    HPI  HPI     Work is good    Feeling good on the vyvanse at 50. Focusing her thoughts. Feels like getting more acclimated to it. Feels like she is fighting going to sleep but this has been a chronic thing. When she actually lies down to sleep falls asleep quickly. Will plan to get the UDS completed.     Thrombocytosis - open to updating cbc    Objective     ROS  Review of Systems  All pertinent positive symptoms are included in the history of present illness.  All other systems have been reviewed and are negative and noncontributory to this patient's current ailments.    PHQ9/GAD7:  No data recorded   No data recorded   No data recorded     Current Medications  Current Outpatient Medications   Medication Instructions    5-hydroxytryptophan, 5-HTP, (5-HTP ORAL) Take by mouth.    ascorbic acid (VITAMIN C) 1,000 mg, Daily    buPROPion XL (WELLBUTRIN XL) 300 mg, oral, Daily, Take with 150 mg for 450 mg total    buPROPion XL (WELLBUTRIN XL) 150 mg, oral, Daily, Add to the 300 mg medication for 450mg total    cetirizine (ZYRTEC) 10 mg, oral, Nightly PRN    cholecalciferol (Vitamin D-3) 50 mcg (2,000 unit) capsule 2 times daily    levonorgestrel (Mirena) 21 mcg/24 hours (8 yrs) 52 mg IUD by intrauterine route.    lisdexamfetamine (VYVANSE) 50 mg, oral, Every morning    multivitamin capsule Take by mouth.    ondansetron (ZOFRAN) 4 mg, oral, Every 8 hours  PRN    SUMAtriptan (IMITREX) 50 mg, oral, Once as needed, May repeat after 2 hours.    topiramate (TOPAMAX) 25 mg, oral, Daily        Allergies  Allergies[1]     VITAL SIGNS  There were no vitals filed for this visit.  There were no vitals filed for this visit.   There is no height or weight on file to calculate BMI.     Physical Exam  Constitutional:       General: She is not in acute distress.  Pulmonary:      Effort: Pulmonary effort is normal. No respiratory distress.   Neurological:      Mental Status: She is alert.   Psychiatric:         Mood and Affect: Mood normal.         Thought Content: Thought content normal.         Assessment/Plan   Assessment & Plan  Attention deficit hyperactivity disorder (ADHD), predominantly inattentive type  Chronic, well controlled with the vyvanse 50  Feels current dose is working well, ensured no side effects  Continue vyvanse 50 - refills x 3 months provided  Recommended to complete urine drug screen ordered prior  Consent signed prior    Orders:    lisdexamfetamine (Vyvanse) 50 mg capsule; Take 1 capsule (50 mg) by mouth once daily in the morning.    lisdexamfetamine (Vyvanse) 50 mg capsule; Take 1 capsule (50 mg) by mouth once daily in the morning. Do not fill before June 26, 2025.    lisdexamfetamine (Vyvanse) 50 mg capsule; Take 1 capsule (50 mg) by mouth once daily in the morning. Do not fill before July 24, 2025.    Thrombocytosis  Noted with labs in March, plan to repeat  Orders:    CBC and Auto Differential; Future        Counseling:       Medication education:           Education:  The patient is counseled regarding potential side-effects of all new medications          Understanding:  Patient expressed understanding of information conveyed today          Adherence:  No barriers to adherence identified     Follow-up: 3 months    ** Please excuse any errors in grammar or translation related to this dictation. Voice recognition software was utilized to prepare this  document. **       Nelda Huffman MD   05/29/25        [1]   Allergies  Allergen Reactions    Oxycodone-Acetaminophen Other    Iodine Rash

## 2025-05-29 NOTE — ASSESSMENT & PLAN NOTE
Chronic, well controlled with the vyvanse 50  Feels current dose is working well, ensured no side effects  Continue vyvanse 50 - refills x 3 months provided  Recommended to complete urine drug screen ordered prior  Consent signed prior    Orders:    lisdexamfetamine (Vyvanse) 50 mg capsule; Take 1 capsule (50 mg) by mouth once daily in the morning.    lisdexamfetamine (Vyvanse) 50 mg capsule; Take 1 capsule (50 mg) by mouth once daily in the morning. Do not fill before June 26, 2025.    lisdexamfetamine (Vyvanse) 50 mg capsule; Take 1 capsule (50 mg) by mouth once daily in the morning. Do not fill before July 24, 2025.

## 2025-06-10 ENCOUNTER — APPOINTMENT (OUTPATIENT)
Dept: INTEGRATIVE MEDICINE | Facility: CLINIC | Age: 35
End: 2025-06-10
Payer: COMMERCIAL

## 2025-06-10 DIAGNOSIS — M25.561 RIGHT MEDIAL KNEE PAIN: Primary | ICD-10-CM

## 2025-06-10 PROCEDURE — 97110 THERAPEUTIC EXERCISES: CPT | Performed by: CHIROPRACTOR

## 2025-06-10 SDOH — SOCIAL STABILITY: SOCIAL NETWORK: I HAVE TROUBLE DOING ALL OF THE ACTIVITIES WITH FRIENDS THAT I WANT TO DO: NEVER

## 2025-06-10 SDOH — ECONOMIC STABILITY: FOOD INSECURITY: WITHIN THE PAST 12 MONTHS, THE FOOD YOU BOUGHT JUST DIDN'T LAST AND YOU DIDN'T HAVE MONEY TO GET MORE.: NEVER TRUE

## 2025-06-10 SDOH — ECONOMIC STABILITY: FOOD INSECURITY: WITHIN THE PAST 12 MONTHS, YOU WORRIED THAT YOUR FOOD WOULD RUN OUT BEFORE YOU GOT MONEY TO BUY MORE.: NEVER TRUE

## 2025-06-10 SDOH — SOCIAL STABILITY: SOCIAL NETWORK

## 2025-06-10 SDOH — SOCIAL STABILITY: SOCIAL NETWORK: I HAVE TROUBLE DOING ALL OF THE FAMILY ACTIVITIES THAT I WANT TO DO: NEVER

## 2025-06-10 SDOH — SOCIAL STABILITY: SOCIAL NETWORK: PROMIS ABILITY TO PARTICIPATE IN SOCIAL ROLES & ACTIVITIES T-SCORE: 58

## 2025-06-10 SDOH — SOCIAL STABILITY: SOCIAL NETWORK: I HAVE TROUBLE DOING ALL OF MY REGULAR LEISURE ACTIVITIES WITH OTHERS: NEVER

## 2025-06-10 SDOH — SOCIAL STABILITY: SOCIAL NETWORK: I HAVE TROUBLE DOING ALL OF MY USUAL WORK (INCLUDE WORK AT HOME): RARELY

## 2025-06-10 ASSESSMENT — ANXIETY QUESTIONNAIRES
PAST MONTH HOW OFTEN HAVE YOU FELT THAT THINGS WERE GOING YOUR WAY: 3 - FAIRLY OFTEN
PAST MONTH HOW OFTEN HAVE YOU FELT CONFIDENT ABOUT YOUR ABILITY TO HANDLE YOUR PROBLEMS: 3 - FAIRLY OFTEN
PAST MONTH HOW OFTEN HAVE YOU FELT DIFFICULTIES WERE PILING UP SO HIGH THAT YOU COULD NOT OVERCOME THEM: 2 - SOMETIMES
PAST MONTH HOW OFTEN HAVE YOU FELT THAT YOU WERE UNABLE TO CONTROL THE IMPORTANT THINGS IN YOUR LIFE: 1 - ALMOST NEVER
PAST MONTH HOW OFTEN HAVE YOU FELT THAT YOU WERE UNABLE TO CONTROL THE IMPORTANT THINGS IN YOUR LIFE: 1 - ALMOST NEVER
PAST MONTH HOW OFTEN HAVE YOU FELT CONFIDENT ABOUT YOUR ABILITY TO HANDLE YOUR PROBLEMS: 3 - FAIRLY OFTEN

## 2025-06-10 ASSESSMENT — PROMIS GLOBAL HEALTH SCALE
RATE_MENTAL_HEALTH: GOOD
EMOTIONAL_PROBLEMS: SOMETIMES
RATE_PHYSICAL_HEALTH: VERY GOOD
CARRYOUT_SOCIAL_ACTIVITIES: VERY GOOD
RATE_AVERAGE_PAIN: 3
RATE_AVERAGE_FATIGUE: MILD
CARRYOUT_PHYSICAL_ACTIVITIES: COMPLETELY
RATE_GENERAL_HEALTH: VERY GOOD
RATE_SOCIAL_SATISFACTION: GOOD
RATE_QUALITY_OF_LIFE: VERY GOOD

## 2025-06-10 NOTE — PROGRESS NOTES
"Subjective     PATIENT ID: Yumi Richard is a 35 y.o. female who presents today Yaritza 10, 2025 for a new patient yoga therapy evaluation.    7/20     SUBJECTIVE/CHECK-IN:  Yumi presents for yoga therapy evaluation with main complaints of recurrent right knee pain, stress, anxiety and depression. She reports that a few years ago she was doing a Warrior 2 pose during yoga and felt she pulled something near her right knee. She woke up the next day with pain. She went through physical therapy with improvement. She notes pain is well controlled when she is consistent with her exercise. However, depressive periods interfere with this and causes her to lose motivation, during which times pain returns. She finds driving and lack of exercise to be the main contributors. Depression is worse in the winter months but has been stable with medication changes over the past few years. She also reports anxiety and ADHD. She prefers to \"not make waves\" and \"keep quiet\".     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 Reports recent transition to working at [employer] in the past year with improvement in stress level. Notes her knee feels good while at work, finding that movement helps. Sleep quality is good, sleeping solid until the morning. She does note recent difficulty with wanting to lay down to sleep at night, potentially related to starting new medication. Energy level has been better on Vyvanse. She does notice a drop in energy in the rare times she has missed a dose. Diet feels good but sometimes difficult to maintain a normal meal scheduled working 12-hour shifts.       -------------------------------------------------------------------------------  EVALUATION:    Visual Inspection:   - Pes planus BL (L>R)   - Genu valgum BL    Lumbar ROM: WNL in all ROM     Kneeling Quad Squat: shift to L    Knee ROM (R): full ROM with mild discomfort at medial aspect     Breath: visualized chest/clavicular breaths with " shallow belly movement     -----------------------------------------------------------------------------------  Session to Include:   - LE mobility   - LE stregthening   - Breathwork   - Guided imagery/mindfulness components     SESSION GOALS:  - Improve right knee pain and strengthen right leg  - Stress/anxiety management  - Tools for depression    Time In: 10:03 a  Time Out: 11:04 a

## 2025-06-13 ENCOUNTER — APPOINTMENT (OUTPATIENT)
Dept: INTEGRATIVE MEDICINE | Facility: CLINIC | Age: 35
End: 2025-06-13
Payer: COMMERCIAL

## 2025-06-18 ENCOUNTER — APPOINTMENT (OUTPATIENT)
Dept: INTEGRATIVE MEDICINE | Facility: CLINIC | Age: 35
End: 2025-06-18
Payer: COMMERCIAL

## 2025-06-18 DIAGNOSIS — M54.2 CERVICALGIA: ICD-10-CM

## 2025-06-18 DIAGNOSIS — M99.01 SEGMENTAL AND SOMATIC DYSFUNCTION OF CERVICAL REGION: Primary | ICD-10-CM

## 2025-06-18 DIAGNOSIS — M99.04 SEGMENTAL AND SOMATIC DYSFUNCTION OF SACRAL REGION: ICD-10-CM

## 2025-06-18 DIAGNOSIS — M99.03 SEGMENTAL AND SOMATIC DYSFUNCTION OF LUMBAR REGION: ICD-10-CM

## 2025-06-18 DIAGNOSIS — G44.86 CERVICOGENIC HEADACHE: ICD-10-CM

## 2025-06-18 DIAGNOSIS — M79.9 POSTURAL STRAIN: ICD-10-CM

## 2025-06-18 DIAGNOSIS — M99.02 SEGMENTAL AND SOMATIC DYSFUNCTION OF THORACIC REGION: ICD-10-CM

## 2025-06-18 DIAGNOSIS — M54.59 MECHANICAL LOW BACK PAIN: ICD-10-CM

## 2025-06-18 DIAGNOSIS — M79.10 MYALGIA: ICD-10-CM

## 2025-06-18 PROCEDURE — MASS60E MASSAGE 60 MINUTES EMPLOYEE RATE

## 2025-06-18 PROCEDURE — 98941 CHIROPRACT MANJ 3-4 REGIONS: CPT | Performed by: CHIROPRACTOR

## 2025-06-18 PROCEDURE — 97112 NEUROMUSCULAR REEDUCATION: CPT | Performed by: CHIROPRACTOR

## 2025-06-18 NOTE — PROGRESS NOTES
Subjective   Patient ID: Yumi Richard is a 35 y.o. female who presents June 18, 2025 for neck pain, headaches and low back pain.    (7/20) VPCY    Today, the patient rates their degree of pain as a 2 out of 10 on the numeric pain rating scale.     Yumi returns for continued treatment of neck pain, headaches and low back pain. Patient states that she is doing well. She states that she has been feeling good since last visit. She states that she has only had to use her migraine rescue medication twice since last visit. She states that she is excited to start yoga therapy. Denies any other associated symptoms or change in medical history since last visit.   ____________________________________________________  Initial Exam:  Yumi presents for evaluation and treatment of neck pain, headaches and low back pain. Patient states that she has a long history of headaches. She states that she gets migraines, tension type headaches and sinus headaches. She states that migraines have recently been helped with topamax and have been reduced to twice a month. She states that tension type headaches are more frequent, 4-5 times a week. She states that neck pain starts at the base of the skull and goes down to the top of the shoulder, R>L. She states that screen time, looking down, and stress increase symptoms while ibuprofen, heat/ice, massage, stretching help reduce symptoms. She states that low back pain began about 2 months ago without incident. She states that pain goes across the low back and into the buttock, L>R. She states that she occasionally has a pinching sensation in her left glute while walking. She states that bending and lifting increase symptoms, while heat and rest help decrease symptoms. Patient denies any  difficulty speaking/swallowing, vision changes, bowel/bladder issues, chest pain/shortness of breath, numbness/tingling. Patient is new to chiropractic care.            Objective   Physical  Exam  Constitutional:       General: She is not in acute distress.     Appearance: Normal appearance.       HENT:      Head: Normocephalic and atraumatic.   Musculoskeletal:      Cervical back: Tenderness present. Pain with movement present. Decreased range of motion.      Thoracic back: Tenderness present.      Lumbar back: Tenderness present. Decreased range of motion.   Neurological:      General: No focal deficit present.      Mental Status: She is alert and oriented to person, place, and time.      Cranial Nerves: No dysarthria or facial asymmetry.      Sensory: Sensation is intact.      Motor: Motor function is intact.      Coordination: Coordination is intact.      Gait: Gait is intact.   Psychiatric:         Attention and Perception: Attention normal.         Mood and Affect: Mood normal.         Speech: Speech normal.         Behavior: Behavior is cooperative.         Palpation of the following regions revealed:  Cervical: Suboccipitals bilateral, hypertonicity and tenderness.  Upper trapezius bilateral, hypertonicity and tenderness.  Levator scapulae bilateral, hypertonicity and tenderness.  Cervical paraspinals bilateral, hypertonicity and tenderness.  Thoracic: Thoracic paraspinals bilateral, hypertonicity and tenderness.  Middle trapezius bilateral, hypertonicity and tenderness.  Pectoralis bilateral, hypertonicity and tenderness.  Lumbar: Lumbar paraspinals bilateral, hypertonicity and tenderness.  Gluteal bilateral, hypertonicity and tenderness.    Segmental Joint(s): Segmental joint dysfunction was assessed with motion palpation and is identified in the following areas:  Cervical : C0 C1 C5 C6 C7  Thoracic : T2., T3, T6, T7, and T10  Lumbopelvic / Sacral SIJ : L1 and L5/S1  Upper Extremities : R Glenohumeral joint and L Glenohumeral joint  Lower Extremities : R Hip and L Hip    Assessment/Plan   Today's Treatment Included: Spinal manipulation to the following regions of segmental dysfunction  identified on examination, using age-appropriate and injury specific force. Segmental Joint(s) Cervical : C0 C1 C5 C6 C7 Segmental Joint(s) Thoracic : T2., T3, T6, T7, and T10 Segmental Joint(s) Lumbopelvic/Sacral SIJ : L1 and L5/S1  Extremity manipulation performed to the following regions: Upper Extremities : R Glenohumeral joint and L Glenohumeral joint Lower Extremities : R Hip and L Hip  Chiropractic manipulation treatment techniques utilized: Diversified CMT, Cervical Manual Traction, and Long-Axis Traction   Soft tissue mobilization techniques utilized during treatment: Pin and Stretch, Cupping, and Ischemic compression  Neuromuscular Re-Education (86793): 1 Units; Start time: 10:25 am  End time: 10:35 am      Soft-tissue mobilization was performed in the following areas:  Suboccipital bilateral, Cervical paraspinal mm. bilateral, Upper Trapezius bilateral, and Levator Scap. bilateral  Thoracic Paraspinal mm. bilateral, Middle Trapezius bilateral, and Pectoralis bilateral  Lumbar Paraspinal mm. bilateral and Gluteal mm.  bilateral    Recommended follow-up in 3 week(s).     The patient tolerated today's treatment with little or no additional discomfort and was instructed to contact the office for questions or concerns.

## 2025-06-24 NOTE — PROGRESS NOTES
Massage Therapy Visit:     Yumi Richard     Condition of Client Subjective :  Patient ID: Yumi Richard is a 35 y.o. 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 Moderate pressure upper body massage, MFR throughout scalp/face, palpations knuckle strokes stripping pin/stretch cross fiber frictions and ischemic pressure throughout scalp face upper traps levator scapulas scalenes omohyoids sternocleidomastoids supraspinatus  occipital attachments and splenis capitis/cervicis, pin/breath knuckle strokes throughout pectoralis major/minor, pétrissage stripping knuckle strokes cross fiber frictions and compressions throughout trapezius rhomboids erector spinae infraspinatus quadratus lumborum latissimus dorsi obliques   palpations knuckle strokes stripping effleurage pin/stretch and cross fiber frictions throughout quadriceps IT band TFL hamstrings         Session Information  Visit Type: Follow-up visit  Description of present complaint: Stress, Headache, Range of motion (ROM), Muscle tension, Discomfort, Fatigue, Myofascial pain, Chronic pain, Gait issues      Review of Systems    Objective   Pre-treatment Assessment  Arrival Mode: Ambulatory  Treatment Precautions: None    Physical Exam    Actions Assessment/Plan :  Provider reviewed plan for the massage session, precautions and contraindications. Patient/guardian/hospital staff has given consent to treat with full understanding of what to expect during the session. Before  massage therapy began, provider explained to the patient to communicate at any time if the pressure was causing discomfort past their tolerance level. Patient agreed to advise therapist.    Massage Treatment  Denies Allergy: Patient denies allergy to topical lubricant  Patient Position: Table, Prone, Supine  Positioning Assistance: Did not need assistance, Pillow(s)/bolster under knees while supine, Pillow(s)/bolster under anles while prone  Pressure Scale: 4 - Moderate pressure    Response:  Post-treatment Assessment  Patient Fell Asleep During Treatment: No  Patient Noted Improvement of the Following Symptoms: Muscle tension, ROM    Evaluation:

## 2025-06-27 ENCOUNTER — TELEPHONE (OUTPATIENT)
Dept: PRIMARY CARE | Facility: CLINIC | Age: 35
End: 2025-06-27
Payer: COMMERCIAL

## 2025-06-27 DIAGNOSIS — F90.0 ATTENTION DEFICIT HYPERACTIVITY DISORDER (ADHD), PREDOMINANTLY INATTENTIVE TYPE: ICD-10-CM

## 2025-06-27 RX ORDER — LISDEXAMFETAMINE DIMESYLATE 50 MG/1
50 CAPSULE ORAL EVERY MORNING
Qty: 30 CAPSULE | Refills: 0 | Status: SHIPPED | OUTPATIENT
Start: 2025-07-24 | End: 2025-08-23

## 2025-06-27 RX ORDER — LISDEXAMFETAMINE DIMESYLATE 50 MG/1
50 CAPSULE ORAL EVERY MORNING
Qty: 30 CAPSULE | Refills: 0 | Status: SHIPPED | OUTPATIENT
Start: 2025-06-27 | End: 2025-07-27

## 2025-06-27 RX ORDER — LISDEXAMFETAMINE DIMESYLATE 50 MG/1
50 CAPSULE ORAL EVERY MORNING
Qty: 30 CAPSULE | Refills: 0 | Status: SHIPPED | OUTPATIENT
Start: 2025-08-21 | End: 2025-09-20

## 2025-06-27 NOTE — TELEPHONE ENCOUNTER
Pdmp reviewed, concordant, control pain medication agreement on file. Refills sent x 3 months.    Please call and remind patient to complete urine drug panel.     Thanks!

## 2025-06-27 NOTE — TELEPHONE ENCOUNTER
MED REFILL    LOV; 25    [id number] - [facility] [address] [facility]  [id number] [place] [id number]  Phone: [phone]  Fax: [phone]     Dispense Quantity: 30 capsule (30 day supply)     lisdexamfetamine (Vyvanse) 50 mg capsule Take 1 capsule (50 mg) by mouth once daily in the morning.   Number of times this order has been changed since signin

## 2025-07-01 ENCOUNTER — APPOINTMENT (OUTPATIENT)
Dept: INTEGRATIVE MEDICINE | Facility: CLINIC | Age: 35
End: 2025-07-01
Payer: COMMERCIAL

## 2025-07-01 DIAGNOSIS — M54.59 MECHANICAL LOW BACK PAIN: ICD-10-CM

## 2025-07-01 DIAGNOSIS — M25.561 RIGHT MEDIAL KNEE PAIN: Primary | ICD-10-CM

## 2025-07-01 PROCEDURE — 97110 THERAPEUTIC EXERCISES: CPT | Performed by: CHIROPRACTOR

## 2025-07-01 NOTE — PROGRESS NOTES
Subjective     PATIENT ID: Yumi Richard is a 35 y.o. female who presents today July 1, 2025 for a new patient yoga therapy evaluation.    9/20     SUBJECTIVE/CHECK-IN:  Yumi returns for yoga therapywith main complaints of recurrent right knee pain, stress, anxiety and depression. She reports that yesterday she had to do some home visits for work which required prolonged periods of driving. This was aggravating for the right knee. She also notes high stress recently preparing for a standardized test at the end of the month - she has been studying a lot. She reports long waits to schedule with providers for personal health concerns and goals. This has increased stress as well.        -------------------------------------------------------------------------------  07/01/2025 SESSION:    - Awareness  + Breath:   - Supine bolster supported   - Breath awareness (direction, depth, barriers, movement)    - 360 breath   - Body scan    - Movement:   - Supine:     - Small joint mobility    - Hamstring pulses    - Spinal twist    - Figure 4    - Pelvic tilts    - Gluteal bridge + block inner thighs   - Mat Work:    - Adductor pulsations + blocks (+ quad)    - Adductor twist    - Side plank stretch    - Side lying abduction and extension (glute activation)    - Side lying clam shell + supported plank    - Standing:    - Hickman Lunge --> Warrior 2 --> Ext Side Angle --> Rev Side Angle    - Warrior 2 Pulsations --> mini pulse    - Mindfulness + Relaxation   - Supine butterfly with block supports   - Breath extended exhalation and expansion   - Progressive physical relaxation   - Guided imagery: ocean waves, grounding into earth      -----------------------------------------------------------------------------------  Session to Include:   - LE mobility   - LE stregthening   - Breathwork   - Guided imagery/mindfulness components     SESSION GOALS:  - Improve right knee pain and strengthen right leg  - Stress/anxiety  management  - Tools for depression    Time In: 12:30 p  Time Out: 1:27 p

## 2025-07-02 ENCOUNTER — APPOINTMENT (OUTPATIENT)
Dept: INTEGRATIVE MEDICINE | Facility: CLINIC | Age: 35
End: 2025-07-02
Payer: COMMERCIAL

## 2025-07-02 DIAGNOSIS — M99.04 SEGMENTAL AND SOMATIC DYSFUNCTION OF SACRAL REGION: ICD-10-CM

## 2025-07-02 DIAGNOSIS — M54.59 MECHANICAL LOW BACK PAIN: ICD-10-CM

## 2025-07-02 DIAGNOSIS — M99.03 SEGMENTAL AND SOMATIC DYSFUNCTION OF LUMBAR REGION: ICD-10-CM

## 2025-07-02 DIAGNOSIS — M54.2 CERVICALGIA: ICD-10-CM

## 2025-07-02 DIAGNOSIS — M99.02 SEGMENTAL AND SOMATIC DYSFUNCTION OF THORACIC REGION: ICD-10-CM

## 2025-07-02 DIAGNOSIS — M79.10 MYALGIA: ICD-10-CM

## 2025-07-02 DIAGNOSIS — M99.01 SEGMENTAL AND SOMATIC DYSFUNCTION OF CERVICAL REGION: Primary | ICD-10-CM

## 2025-07-02 DIAGNOSIS — G44.86 CERVICOGENIC HEADACHE: ICD-10-CM

## 2025-07-02 DIAGNOSIS — M79.9 POSTURAL STRAIN: ICD-10-CM

## 2025-07-02 PROCEDURE — 97112 NEUROMUSCULAR REEDUCATION: CPT | Performed by: CHIROPRACTOR

## 2025-07-02 PROCEDURE — 98941 CHIROPRACT MANJ 3-4 REGIONS: CPT | Performed by: CHIROPRACTOR

## 2025-07-02 NOTE — PROGRESS NOTES
Subjective   Patient ID: Yumi Richard is a 35 y.o. female who presents July 2, 2025 for neck pain, headaches and low back pain.    (8/20) VPCY    Today, the patient rates their degree of pain as a 3 out of 10 on the numeric pain rating scale.     Yumi returns for continued treatment of neck pain, headaches and low back pain. Patient states that she is well today. She states that she has a knot in her upper back on the right that causes pain when she looks to the left. She states that it has increased neck and head tension. She states that she has had only one migraine since her last visit. She states that she is really enjoying yoga therapy.  Denies any other associated symptoms or change in medical history since last visit.   ____________________________________________________  Initial Exam:  Yumi presents for evaluation and treatment of neck pain, headaches and low back pain. Patient states that she has a long history of headaches. She states that she gets migraines, tension type headaches and sinus headaches. She states that migraines have recently been helped with topamax and have been reduced to twice a month. She states that tension type headaches are more frequent, 4-5 times a week. She states that neck pain starts at the base of the skull and goes down to the top of the shoulder, R>L. She states that screen time, looking down, and stress increase symptoms while ibuprofen, heat/ice, massage, stretching help reduce symptoms. She states that low back pain began about 2 months ago without incident. She states that pain goes across the low back and into the buttock, L>R. She states that she occasionally has a pinching sensation in her left glute while walking. She states that bending and lifting increase symptoms, while heat and rest help decrease symptoms. Patient denies any  difficulty speaking/swallowing, vision changes, bowel/bladder issues, chest pain/shortness of breath, numbness/tingling.  Patient is new to chiropractic care.            Objective   Physical Exam  Constitutional:       General: She is not in acute distress.     Appearance: Normal appearance.       HENT:      Head: Normocephalic and atraumatic.   Musculoskeletal:      Cervical back: Tenderness present. Pain with movement present. Decreased range of motion.      Thoracic back: Tenderness present.      Lumbar back: Tenderness present. Decreased range of motion.   Neurological:      General: No focal deficit present.      Mental Status: She is alert and oriented to person, place, and time.      Cranial Nerves: No dysarthria or facial asymmetry.      Sensory: Sensation is intact.      Motor: Motor function is intact.      Coordination: Coordination is intact.      Gait: Gait is intact.   Psychiatric:         Attention and Perception: Attention normal.         Mood and Affect: Mood normal.         Speech: Speech normal.         Behavior: Behavior is cooperative.       Palpation of the following regions revealed:  Cervical: Suboccipitals bilateral, hypertonicity and tenderness.  Upper trapezius bilateral, hypertonicity and tenderness.  Levator scapulae bilateral, hypertonicity and tenderness.  Cervical paraspinals bilateral, hypertonicity and tenderness.  Thoracic: Thoracic paraspinals bilateral, hypertonicity and tenderness.  Middle trapezius bilateral, hypertonicity and tenderness.  Pectoralis bilateral, hypertonicity and tenderness.  Lumbar: Lumbar paraspinals bilateral, hypertonicity and tenderness.  Gluteal bilateral, hypertonicity and tenderness.    Segmental Joint(s): Segmental joint dysfunction was assessed with motion palpation and is identified in the following areas:  Cervical : C0 C1 C5 C6 C7  Thoracic : T2., T3, T6, T7, and T10  Lumbopelvic / Sacral SIJ : L1 and L5/S1  Upper Extremities : R Glenohumeral joint and L Glenohumeral joint  Lower Extremities : R Hip and L Hip    Assessment/Plan   Today's Treatment Included: Spinal  manipulation to the following regions of segmental dysfunction identified on examination, using age-appropriate and injury specific force. Segmental Joint(s) Cervical : C0 C1 C5 C6 C7 Segmental Joint(s) Thoracic : T2., T3, T6, T7, and T10 Segmental Joint(s) Lumbopelvic/Sacral SIJ : L1 and L5/S1  Extremity manipulation performed to the following regions: Upper Extremities : R Glenohumeral joint and L Glenohumeral joint Lower Extremities : R Hip and L Hip  Chiropractic manipulation treatment techniques utilized: Diversified CMT, Cervical Manual Traction, and Long-Axis Traction   Soft tissue mobilization techniques utilized during treatment: Pin and Stretch, Cupping, and Ischemic compression  Neuromuscular Re-Education (50507): 2 Units; Start time: 10:25 am  End time: 10:50 am      Soft-tissue mobilization was performed in the following areas:  Suboccipital bilateral, Cervical paraspinal mm. bilateral, Upper Trapezius bilateral, and Levator Scap. bilateral  Thoracic Paraspinal mm. bilateral, Middle Trapezius bilateral, and Pectoralis bilateral  Lumbar Paraspinal mm. bilateral and Gluteal mm.  bilateral    Recommended follow-up in 3 week(s).     The patient tolerated today's treatment with little or no additional discomfort and was instructed to contact the office for questions or concerns.

## 2025-07-08 ENCOUNTER — APPOINTMENT (OUTPATIENT)
Dept: INTEGRATIVE MEDICINE | Facility: CLINIC | Age: 35
End: 2025-07-08
Payer: COMMERCIAL

## 2025-07-08 DIAGNOSIS — M54.59 MECHANICAL LOW BACK PAIN: Primary | ICD-10-CM

## 2025-07-08 DIAGNOSIS — M25.561 RIGHT MEDIAL KNEE PAIN: ICD-10-CM

## 2025-07-08 PROCEDURE — 97110 THERAPEUTIC EXERCISES: CPT | Performed by: CHIROPRACTOR

## 2025-07-08 NOTE — PROGRESS NOTES
Subjective     PATIENT ID: Yumi Richard is a 35 y.o. female who presents today July 8, 2025 for a new patient yoga therapy evaluation.    11/20     SUBJECTIVE/CHECK-IN:  Yumi returns for yoga therapy with main complaints of recurrent right knee pain, stress, anxiety and depression. She has been using some of the exercises at home following our last session without issue. Her knees feels a bit bothersome today. Not sure if she did too much activity at work yesterday. She notes continued difficulty transitioning into sleep but once she is asleep she is okay. She had a flat tire yesterday which was stressful, but other wins throughout the week have felt like a positive shift in the right direction.        -------------------------------------------------------------------------------  07/08/2025 SESSION:    - Awareness  + Breath:   - Supine bolster supported   - Breath awareness (direction, depth, barriers, movement)    - 360 breath   - Body scan    - Movement:   - Supine:     - Small joint mobility    - Hamstring pulses    - Spinal twist    - Figure 4    - Pelvic tilts    - Gluteal bridge + block inner thighs   - Mat Work:    - Adductor pulsations + blocks (+ quad)    - Adductor twist    - Side plank stretch    - Side lying abduction and extension (glute activation)    - Side lying clam shell + supported plank    - Standing:    - [place] Lunge --> Warrior 2 --> Ext Side Angle --> Rev Side Angle    - Warrior 2 Pulsations --> mini pulse    - Mindfulness + Relaxation   - Supine butterfly with block supports   - Breath extended exhalation and expansion   - Progressive physical relaxation      -----------------------------------------------------------------------------------  Session to Include:   - LE mobility   - LE stregthening   - Breathwork   - Guided imagery/mindfulness components     SESSION GOALS:  - Improve right knee pain and strengthen right leg  - Stress/anxiety management  - Tools for  depression    Time In: 11:00 a  Time Out: 11:57 a

## 2025-07-22 ENCOUNTER — APPOINTMENT (OUTPATIENT)
Dept: INTEGRATIVE MEDICINE | Facility: CLINIC | Age: 35
End: 2025-07-22
Payer: COMMERCIAL

## 2025-07-22 DIAGNOSIS — M54.59 MECHANICAL LOW BACK PAIN: Primary | ICD-10-CM

## 2025-07-22 DIAGNOSIS — M25.561 RIGHT MEDIAL KNEE PAIN: ICD-10-CM

## 2025-07-22 PROCEDURE — 97110 THERAPEUTIC EXERCISES: CPT | Performed by: CHIROPRACTOR

## 2025-07-22 NOTE — PROGRESS NOTES
Subjective     PATIENT ID: Yumi Richard is a 35 y.o. female who presents today July 22, 2025 for a new patient yoga therapy evaluation.    12/20     SUBJECTIVE/CHECK-IN:  Yumi returns for yoga therapy with main complaints of recurrent right knee pain, stress, anxiety and depression. She reports stress levels have been high as she approaches a major test she has been studying for over a month. Notes the test is Sunday. She has been pushing off other tasks until this is completed. She notes increased physical tension in the neck and back as a result. No chiropractic recently but is scheduled for massage tomorrow. Right knee feels achy today. Sleep has been about the same but she is trying to go to bed earlier.        -------------------------------------------------------------------------------  07/22/2025 SESSION:    - Awareness  + Breath:   - Supine bolster supported   - Breath awareness (direction, depth, barriers, movement)    - Body scan    - Movement:   - Supine:     - Small joint mobility    - Hamstring pulses    - Spinal twist   - Mat Work:    - Side lying abduction and extension (glute activation)    - Side lying clam shell + supported plank    - Cat Cow    - Modified curtsy pigeon    - Modified curtsy updog    - Rosa Pose    - Standing:    - [place] Lunge --> Reverse Twist --> Warrior 2 --> Ext Side Angle --> Rev Side Angle    - Warrior 2 Pulsations --> mini pulse    - Standing Splits --> Curtsy Squats    - Mindfulness + Relaxation   - Supine with bolster support   - Progressive relaxation with breath   - Guided imagery - calming      -----------------------------------------------------------------------------------  Session to Include:   - LE mobility   - LE stregthening   - Breathwork   - Guided imagery/mindfulness components     SESSION GOALS:  - Improve right knee pain and strengthen right leg  - Stress/anxiety management  - Tools for depression    Time In: 12:30 p  Time Out: 1:25 p

## 2025-07-23 ENCOUNTER — APPOINTMENT (OUTPATIENT)
Dept: INTEGRATIVE MEDICINE | Facility: CLINIC | Age: 35
End: 2025-07-23
Payer: COMMERCIAL

## 2025-07-23 PROCEDURE — MASS60E MASSAGE 60 MINUTES EMPLOYEE RATE

## 2025-07-23 ASSESSMENT — LIFESTYLE VARIABLES
TOBACCO_USE: NO
HISTORY_ALCOHOL_USE: NO

## 2025-07-24 NOTE — PROGRESS NOTES
Massage Therapy Visit:     Yumi Richard     Condition of Client Subjective :  Patient ID: Yumi Richard is a 35 y.o. female who presents for reason for visit of   Bilateral neck and shoulder tension creating discomfort in ROM and contributing to headaches/migraines   Migraines and tension headaches that start in neck and around eyes   Bilateral tension throughout back from thoracic into lumbar spine  Is a labor and delivery nurse in [employer]   Looking for deeper pressure  Bilateral pectoral tension   Significant tension throughout occipital ridge, neck, and shoulders  Has been extra stressed and is about to gain a higher nursing certification       50 min. Moderate pressure upper body massage, MFR throughout scalp/face, palpations knuckle strokes stripping pin/stretch cross fiber frictions and ischemic pressure throughout scalp face upper traps levator scapulas scalenes omohyoids sternocleidomastoids supraspinatus  occipital attachments and splenis capitis/cervicis, pin/breath knuckle strokes throughout pectoralis major/minor, pétrissage stripping knuckle strokes cross fiber frictions and compressions throughout trapezius rhomboids erector spinae infraspinatus quadratus lumborum latissimus dorsi obliques         Session Information  Visit Type: Follow-up visit  Description of present complaint: Stress, Discomfort, Muscle tension, Fatigue, Chronic pain, Gait issues      Review of Systems    Objective   Pre-treatment Assessment  Arrival Mode: Ambulatory  Treatment Precautions: None    Physical Exam    Actions Assessment/Plan :  Provider reviewed plan for the massage session, precautions and contraindications. Patient/guardian/hospital staff has given consent to treat with full understanding of what to expect during the session. Before massage therapy began, provider explained to the patient to communicate at any time if the pressure was causing discomfort past their tolerance level. Patient agreed to advise  therapist.    Massage Treatment  Denies Allergy: Patient denies allergy to topical lubricant  Patient Position: Table, Prone, Supine  Positioning Assistance: Did not need assistance, Pillow(s)/bolster under knees while supine, Pillow(s)/bolster under anles while prone  Pressure Scale: 4 - Moderate pressure    Response:  Post-treatment Assessment  Patient Fell Asleep During Treatment: No  Patient Noted Improvement of the Following Symptoms: Muscle tension, ROM    Evaluation:

## 2025-07-29 ENCOUNTER — APPOINTMENT (OUTPATIENT)
Dept: INTEGRATIVE MEDICINE | Facility: CLINIC | Age: 35
End: 2025-07-29
Payer: COMMERCIAL

## 2025-07-29 DIAGNOSIS — M54.59 MECHANICAL LOW BACK PAIN: Primary | ICD-10-CM

## 2025-07-29 DIAGNOSIS — M25.561 RIGHT MEDIAL KNEE PAIN: ICD-10-CM

## 2025-07-29 PROCEDURE — 97110 THERAPEUTIC EXERCISES: CPT | Performed by: CHIROPRACTOR

## 2025-07-29 NOTE — PROGRESS NOTES
Subjective     PATIENT ID: Yumi Richard is a 35 y.o. female who presents today July 29, 2025 for a new patient yoga therapy evaluation.    13/20     SUBJECTIVE/CHECK-IN:  Yumi returns for yoga therapy with main complaints of recurrent right knee pain, stress and anxiety. She took her major test this past weekend and feels relatively good about it overall, despite some remaining stress as she awaits her results. She notes that the transition to sleep continues to be something she is consciously working on by focusing on moving up her bedtime. Her right knee and thigh are bothersome and sore today without inciting trauma/incident. She has continued her exercises at home but not as consistently as she recently prepared for her test.        -------------------------------------------------------------------------------  07/29/2025 SESSION:    - Awareness  + Breath:   - Supine bolster supported   - Breath awareness (direction, depth, barriers, movement)    - Body scan    - Movement:   - Supine:     - Small joint mobility    - Hip circles    - Gluteal twist --> abduction   - Mat Work:    - Thread the needle --> Side plank opener    - Modified curtsy pigeon    - Modified curtsy updog    - Side lying abduction and extension (glute activation)    - Side lying clam shell + supported plank    - Supine figure-4    - Spinal twist   - Standing:    - [place] Twist --> [place] 2 Twist   - Warrior 2 --> Rev Warrior --> Ext Side Angle    - Warrior 2 Pulsations --> mini pulse    - Standing Splits --> Curtsy Squats    - Forward fold   - Cool down:    - Supine figure 4    - Knee to chest rocks - LB release    - Mindfulness + Relaxation   - Supine with bolster support   - Progressive relaxation with breath   - Guided imagery - calming, supportive (sand)      -----------------------------------------------------------------------------------  Session to Include:   - LE mobility   - FESTUS christina   - Breathwork   - Guided  imagery/mindfulness components     SESSION GOALS:  - Improve right knee pain and strengthen right leg  - Stress/anxiety management  - Tools for depression    Time In: 10:00 a  Time Out: 10:55 a

## 2025-07-31 ENCOUNTER — CONSULT (OUTPATIENT)
Dept: ENDOCRINOLOGY | Facility: CLINIC | Age: 35
End: 2025-07-31
Payer: COMMERCIAL

## 2025-07-31 ENCOUNTER — ANCILLARY PROCEDURE (OUTPATIENT)
Dept: ENDOCRINOLOGY | Facility: CLINIC | Age: 35
End: 2025-07-31
Payer: COMMERCIAL

## 2025-07-31 ENCOUNTER — APPOINTMENT (OUTPATIENT)
Dept: LAB | Facility: HOSPITAL | Age: 35
End: 2025-07-31
Payer: COMMERCIAL

## 2025-07-31 VITALS
BODY MASS INDEX: 19.29 KG/M2 | DIASTOLIC BLOOD PRESSURE: 73 MMHG | SYSTOLIC BLOOD PRESSURE: 109 MMHG | RESPIRATION RATE: 18 BRPM | HEART RATE: 82 BPM | TEMPERATURE: 97.9 F | WEIGHT: 104.8 LBS | OXYGEN SATURATION: 100 % | HEIGHT: 62 IN

## 2025-07-31 DIAGNOSIS — Z01.83 ENCOUNTER FOR RH BLOOD TYPING: ICD-10-CM

## 2025-07-31 DIAGNOSIS — Z11.3 SCREENING FOR STDS (SEXUALLY TRANSMITTED DISEASES): ICD-10-CM

## 2025-07-31 DIAGNOSIS — Z31.41 FERTILITY TESTING: Primary | ICD-10-CM

## 2025-07-31 DIAGNOSIS — Z31.62 ENCOUNTER FOR FERTILITY PRESERVATION COUNSELING: ICD-10-CM

## 2025-07-31 LAB
ABO GROUP (TYPE) IN BLOOD: NORMAL
ANTIBODY SCREEN: NORMAL
RH FACTOR (ANTIGEN D): NORMAL

## 2025-07-31 PROCEDURE — 99215 OFFICE O/P EST HI 40 MIN: CPT | Performed by: OBSTETRICS & GYNECOLOGY

## 2025-07-31 PROCEDURE — 86850 RBC ANTIBODY SCREEN: CPT

## 2025-07-31 PROCEDURE — 86901 BLOOD TYPING SEROLOGIC RH(D): CPT

## 2025-07-31 PROCEDURE — 99205 OFFICE O/P NEW HI 60 MIN: CPT | Performed by: OBSTETRICS & GYNECOLOGY

## 2025-07-31 PROCEDURE — 86900 BLOOD TYPING SEROLOGIC ABO: CPT

## 2025-07-31 ASSESSMENT — COLUMBIA-SUICIDE SEVERITY RATING SCALE - C-SSRS
1. IN THE PAST MONTH, HAVE YOU WISHED YOU WERE DEAD OR WISHED YOU COULD GO TO SLEEP AND NOT WAKE UP?: NO
2. HAVE YOU ACTUALLY HAD ANY THOUGHTS OF KILLING YOURSELF?: NO
6. HAVE YOU EVER DONE ANYTHING, STARTED TO DO ANYTHING, OR PREPARED TO DO ANYTHING TO END YOUR LIFE?: NO

## 2025-07-31 ASSESSMENT — PAIN SCALES - GENERAL: PAINLEVEL_OUTOF10: 0-NO PAIN

## 2025-07-31 NOTE — PATIENT INSTRUCTIONS
ASSESSMENT   ASSESSMENT   35 y.o.  female desiring preservation for age    COUNSELING  We discussed options for fertility preservation including oocyte cryopreservation and embryo cryopreservation, and discussed advances in cryopreservation specifically the optimization of vitrification to enhance oocyte freezing and enhance the likelihood of pregnancies after thaw.  Discussed with patient the clinical data that currently exists about efficacy of oocyte cryopreservation as a strategy for fertility preservation and that this is an evolving area. At present several concepts have been demonstrated. Increased maternal age likely impacts total egg yield and likelihood of pregnancy after thaw. Offspring outcomes appear to be comparable for children conceived after IVF and oocyte freezing/thaw/IVF but research in this area is evolving and much more outcomes data exists for children conceived after embryo cryo than oocyte cryo. An upper limit on duration of time that oocytes can be frozen and still result in a pregnancy has not been defined. There is no guarantee for a pregnancy with any amount of oocytes cryopreserved. Reviewed nature of stimulation, injectable hormones used, and monitoring process and the process of oocyte retrieval as an outpatient surgical procedure to harvest oocytes. Risks of this procedure include ovarian hyperstimulation syndrome, anesthesia risks during egg retrieval. Reviewed need to consult with financial specialists in our office to delineate coverage and costs.    IVF Plan  35 y.o. desires to proceed with IVF/egg freezing.     We reviewed IVF and discussed the following:  Stimulation Protocols  Oocyte retrieval, risks   Cryopreservation  Subsequent In-vitro fertilization and embryo transfer  Statistics for success  ICSI/ Assisted hatching  Risks of OHSS  Dropped cycles  Use of birth control  Team of physicians  Informed consent procedure  Folic acid supplementation    ART Cycle  Plan    1. Protocol/Fertility Plan Update:   Lead in: Pending [facility]  Stimulation protocol: PENDING [facility]  Trigger plan: HCG vs Lupron and PENDING [facility]  Pre-retrieval meds: Antibiotics per protocol  Adjuncts: None  Notes:     2. FET: TBD     3. Insemination:  Sperm source: na  Sperm collection method: N/A  Notes:  ICSI: No  # of oocytes to be fertilized: na    4. Transfer: TBD    5. Cryopreservation plan  PGT: No   Oocyte cryopreservation? Yes, Freeze mature eggs only    6. Patient willing to accept blood transfusion: Yes    7. RN to review chart, initiate IVF boarding pass, and assure completion of the following prior to proceeding with IVF stimulation:       Orders Placed This Encounter   Procedures    US pelvis transvaginal    [facility] ANTI-MULLERIAN HORMONE ([facility]), FEMALE    Type And Screen Is this order related to pregnancy or an upcoming surgery? No    Syphilis Screen with Reflex    C. trachomatis / N. gonorrhoeae, Amplified, Urogenital     STDs (Hepatitis B, Hepatitis C, HIV, Syphilis, GC/CT) for patient and partner (if     applicable) to  be completed within the last year (z11.3)  Genetic carrier testing: waiver or carrier screen completed with clearance    documentation   by provider for both patient and partner (Z13.71)  CBC if not performed within the last month (Z01.81)  Type & Screen if not performed within the last month(z01.83)  [facility] to be completed within the last year (z31.41)  Pre-IVF Imaging: Reference any orders placed by provider  Frozen sperm sample: ensure frozen partner sample (z31.41) or verify donor sperm on   site  prior to stimulation start date.  Verify in EMR or obtain copy of patient’s last mammogram (if applicable) and pap smear  results for provider review in boarding pass.  Enroll in Eng[facility] and complete annual consent forms for IVF and cryotransport  Agreements.  Consults: nurse consult and financial consult  Additional consults None and review what is in the boarding  Pass.    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

## 2025-07-31 NOTE — PROGRESS NOTES
Visit Type: In Person  N/A    NEW FERTILITY PATIENT VISIT    Referred by: Madelyn Contreras MD  Accompanied today by: PAT Alegrejeremy Richard is a 35 y.o.  female who presents with     Desire for egg freezing    Have you had any concerns about your fertility treatments so far? None   What are you goals for today's visit? To discuss options and procedures for egg collection and freezing. To determine if there are any eggs to be collected   What causes of infertility have been identified on your workup so far? I have not had any previous fertility work up   Past Infertility Treatments: No   Please summarize your fertility treatments to date.     As far as you are aware, do you have insurance coverage for fertility diagnostic testing and/or fertility treatments? 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[employer], working for [employer] x 9 years  Not currently in a relationship  Last relationship ended- had been considering pregnancy in that relationship    Now considering her options- not sure if she wants to have children in the future but wants to leave options open    Has Mirena IUD in place    PRIOR EVALUATION / TREATMENT   Latest Reference Range & Units Most Recent   HEMOGLOBIN A1c <5.7 % of total Hgb 5.1  3/20/25 12:36   eAG (mg/dL) mg/dL 100  3/20/25 12:36   eAG (mmol/L) mmol/L 5.5  3/20/25 12:36   Thyroid Stimulating Hormone 0.44 - 3.98 mIU/L 1.19  24 10:05   TSH mIU/L 1.82  3/20/25 12:36   VITAMIN D,25-OH,TOTAL,IA 30 - 100 ng/mL 95  3/20/25 12:36   GLUCOSE 74 - 99 mg/dL 88  24 10:05       Prior Labs  Relationship Status:   Single    Have you ever been pregnant? No  How many times have you been pregnant?    Have you ever had a miscarriage? No  How many times have you had a miscarriage?       OB Hx     OB History          0    Para   0    Term   0       0    AB   0    Living   0         SAB   0    IAB   0    Ectopic   0    Multiple   0    Live Births   0                 GYN HISTORY   Have you ever been  diagnosed with a sexually transmitted disease? No  Please select all that are applicable:    Have you ever had Pelvic Inflammatory Disease? No  Have you had an abnormal PAP smear? Yes  Date & Result of last PAP smear:2022 Negative HPV negative  Have you ever had an abnormal Mammogram? No  Date & result of your last mammogram:    Do you have pelvic pain? No  How many times per week do you have intercourse? NA  Do you have pain with intercourse? No  Do you use lubricants with intercourse?    Do you have pain with bowel movements? No  Do you have pain with a full bladder? No    MENSTRUAL HISTORY  LMP: Other  Menarche: 2001 at 11 years old  Contraception: Mirena IUD  Cycle length: 24  -Getting periods Q28 days even with Mirena IUD in place  Describe your bleeding: Light  Dysmenorrhea: No  -Prior to IUD periods very heavy and extremely painful +nausea    ENDOCRINE/INFERTILITY HISTORY  Duration of infertility: Not applicable  Coital Activity/week: NA  Nipple Discharge: No  Vision changes: No  Headaches: Yes  -Migraines without aura  Excess hair growth: No  Excessive hair loss: No  Acne: Yes  Oily skin: No  Recent weight change  Weight gain: No  Weight loss: No  Exercise more than 3 times a week: No    PMH  Medical History[1]  Patellar femoral syndrome  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 Yes  Anxiety, ADHD, Depression  Have you ever been hospitalized for a mental health disorder? No     SOCIAL HISTORY  Social History[4]  Occupation: RN  Have you ever been incarcerated? No  Do you have a history of domestic violence? No  Do you feel safe at home? Yes  Do you have a history of any negative sexual experience such as incest or rape? No       PARTNER HISTORY  N/A      FAMILY HISTORY  Family  "History[5]    CANCER HISTORY    Breast: No  Ovarian: No  Colon: No  Endometrial: No    FAMILY VTE HISTORY  Family History of Blood Clots: Yes, maternal grandmother  -She had Afib    GENETIC HISTORY  Ethnic Background  Patient: White - Western   Genetic Disease in Family  Patient: No  Genetic screening performed previously:   None     BMI:   BMI Readings from Last 1 Encounters:   25 19.48 kg/m²     VITALS:  /73   Pulse 82   Temp 36.6 °C (97.9 °F) (Temporal)   Resp 18   Ht 1.562 m (5' 1.5\")   Wt 47.5 kg (104 lb 12.8 oz)   LMP 2025   SpO2 100%   BMI 19.48 kg/m²     Intimate Exam Performed: No, an intimate exam was not performed at this encounter.     ASSESSMENT   ASSESSMENT   35 y.o.  female desiring preservation for age    COUNSELING  We discussed options for fertility preservation including oocyte cryopreservation and embryo cryopreservation, and discussed advances in cryopreservation specifically the optimization of vitrification to enhance oocyte freezing and enhance the likelihood of pregnancies after thaw.  Discussed with patient the clinical data that currently exists about efficacy of oocyte cryopreservation as a strategy for fertility preservation and that this is an evolving area. At present several concepts have been demonstrated. Increased maternal age likely impacts total egg yield and likelihood of pregnancy after thaw. Offspring outcomes appear to be comparable for children conceived after IVF and oocyte freezing/thaw/IVF but research in this area is evolving and much more outcomes data exists for children conceived after embryo cryo than oocyte cryo. An upper limit on duration of time that oocytes can be frozen and still result in a pregnancy has not been defined. There is no guarantee for a pregnancy with any amount of oocytes cryopreserved. Reviewed nature of stimulation, injectable hormones used, and monitoring process and the process of oocyte retrieval as an " outpatient surgical procedure to harvest oocytes. Risks of this procedure include ovarian hyperstimulation syndrome, anesthesia risks during egg retrieval. Reviewed need to consult with financial specialists in our office to delineate coverage and costs.    IVF Plan  35 y.o. desires to proceed with IVF/egg freezing.     We reviewed IVF and discussed the following:  Stimulation Protocols  Oocyte retrieval, risks   Cryopreservation  Subsequent In-vitro fertilization and embryo transfer  Statistics for success  ICSI/ Assisted hatching  Risks of OHSS  Dropped cycles  Use of birth control  Team of physicians  Informed consent procedure  Folic acid supplementation    ART Cycle Plan    1. Protocol/Fertility Plan Update:   Lead in: Pending [facility]  Stimulation protocol: PENDING [facility]  Trigger plan: HCG vs Lupron and PENDING [facility]  Pre-retrieval meds: Antibiotics per protocol  Adjuncts: None  Notes:     2. FET: TBD     3. Insemination:  Sperm source: na  Sperm collection method: N/A  Notes:  ICSI: No  # of oocytes to be fertilized: na    4. Transfer: TBD    5. Cryopreservation plan  PGT: No   Oocyte cryopreservation? Yes, Freeze mature eggs only    6. Patient willing to accept blood transfusion: Yes    7. RN to review chart, initiate IVF boarding pass, and assure completion of the following prior to proceeding with IVF stimulation:       Orders Placed This Encounter   Procedures    US pelvis transvaginal    QUEST ANTI-MULLERIAN HORMONE ([facility]), FEMALE    Type And Screen Is this order related to pregnancy or an upcoming surgery? No    Syphilis Screen with Reflex    C. trachomatis / N. gonorrhoeae, Amplified, Urogenital     STDs (Hepatitis B, Hepatitis C, HIV, Syphilis, GC/CT) for patient and partner (if     applicable) to  be completed within the last year (z11.3)  Genetic carrier testing: waiver or carrier screen completed with clearance    documentation   by provider for both patient and partner (Z13.71)  CBC if not performed within  the last month (Z01.81)  Type & Screen if not performed within the last month(z01.83)  [facility] to be completed within the last year (z31.41)  Pre-IVF Imaging: Reference any orders placed by provider  Frozen sperm sample: ensure frozen partner sample (z31.41) or verify donor sperm on   site  prior to stimulation start date.  Verify in EMR or obtain copy of patient’s last mammogram (if applicable) and pap smear  results for provider review in boarding pass.  Enroll in Engaged MD and complete annual consent forms for IVF and cryotransport  Agreements.  Consults: nurse consult and financial consult  Additional consults None and review what is in the boarding  Pass.    Needs virtual FUV with me to review protocol after [facility]/TVUS returned    Needs PAT due to history of tracheal stenosis    Sharri Archer  07/31/2025  9:31 AM       [1]   Past Medical History:  Diagnosis Date    ADHD (attention deficit hyperactivity disorder)     Allergic     Anemia     Anxiety     ASCUS with positive high risk HPV cervical 06/01/2023    GENI I (cervical intraepithelial neoplasia I) 06/01/2023    Depression     Encounter for follow-up examination after completed treatment for conditions other than malignant neoplasm 04/06/2015    Follow up    Encounter for initial prescription of implantable subdermal contraceptive 10/22/2015    Nexplanon insertion    Encounter for insertion of intrauterine contraceptive device 12/01/2017    Encounter for insertion of mirena IUD    Encounter for pregnancy test, result negative 05/27/2022    Pregnancy test negative    Encounter for surveillance of implantable subdermal contraceptive 12/01/2017    Nexplanon removal    Headache 07/22/2024    Other abnormal auditory perceptions, bilateral 02/22/2017    Abnormal auditory perception, bilateral    Other abnormal glucose     Hemoglobin A1c less than 7.0%    Other conditions influencing health status 01/16/2014    History of cough    Other conditions influencing  health status 02/13/2015    History of dyspareunia    Other specified anxiety disorders 02/22/2017    Other specified anxiety disorders    Patellofemoral disorders, unspecified knee 02/11/2022    Patella-femoral syndrome    Personal history of other diseases of the female genital tract 10/07/2016    History of dysfunctional uterine bleeding    Personal history of other diseases of the respiratory system 09/17/2017    History of tracheal stenosis    Personal history of other diseases of the respiratory system     History of pneumothorax    Personal history of other mental and behavioral disorders     History of depression    Personal history of other specified conditions 11/28/2018    History of headache    Personal history of other specified conditions 11/28/2018    History of fatigue    Varicella    [2]   Current Outpatient Medications on File Prior to Visit   Medication Sig Dispense Refill    5-hydroxytryptophan, 5-HTP, (5-HTP ORAL) Take by mouth.      ascorbic acid (Vitamin C) 1,000 mg tablet Take 1 tablet (1,000 mg) by mouth once daily.      buPROPion XL (Wellbutrin XL) 150 mg 24 hr tablet Take 1 tablet (150 mg) by mouth once daily. Add to the 300 mg medication for 450mg total 90 tablet 3    buPROPion XL (Wellbutrin XL) 300 mg 24 hr tablet Take 1 tablet (300 mg) by mouth once daily. Take with 150 mg for 450 mg total 90 tablet 3    cetirizine (ZyrTEC) 10 mg tablet Take 1 tablet (10 mg) by mouth as needed at bedtime for allergies (sinus pressure). 90 tablet 3    cholecalciferol (Vitamin D-3) 50 mcg (2,000 unit) capsule Take by mouth twice a day.      levonorgestrel (Mirena) 21 mcg/24 hours (8 yrs) 52 mg IUD by intrauterine route.      lisdexamfetamine (Vyvanse) 50 mg capsule Take 1 capsule (50 mg) by mouth once daily in the morning. Do not fill before July 24, 2025. 30 capsule 0    [START ON 8/21/2025] lisdexamfetamine (Vyvanse) 50 mg capsule Take 1 capsule (50 mg) by mouth once daily in the morning. Do not fill  before August 21, 2025. 30 capsule 0    multivitamin capsule Take by mouth.      ondansetron (Zofran) 4 mg tablet Take 1 tablet (4 mg) by mouth every 8 hours if needed for nausea or vomiting. 20 tablet 1    SUMAtriptan (Imitrex) 50 mg tablet Take 1 tablet (50 mg) by mouth 1 time if needed for migraine. May repeat after 2 hours. 9 tablet 5    topiramate (Topamax) 25 mg tablet Take 1 tablet (25 mg) by mouth once daily. 90 tablet 2    lisdexamfetamine (Vyvanse) 50 mg capsule Take 1 capsule (50 mg) by mouth once daily in the morning. 30 capsule 0     No current facility-administered medications on file prior to visit.   [3]   Past Surgical History:  Procedure Laterality Date    APPENDECTOMY  02/13/2015    Appendectomy    MOUTH SURGERY  02/13/2015    Oral Surgery Tooth Extraction    OTHER SURGICAL HISTORY  03/03/2014    Bronchoscopy For Relief Of Stenosis By Balloon Dilation    OTHER SURGICAL HISTORY  02/07/2017    Direct Laryngoscopy   [4]   Social History  Tobacco Use    Smoking status: Never    Smokeless tobacco: Never   Substance Use Topics    Alcohol use: Yes     Alcohol/week: 1.0 standard drink of alcohol     Types: 1 Glasses of wine per week     Comment: occasionally    Drug use: Never   [5]   Family History  Problem Relation Name Age of Onset    Other (htn) Mother Marlena     Hypertension Mother Marlena     Arthritis Mother Marlena     Diabetes Father Peter     Heart disease Father Peter     Alcohol abuse Father Peter     Alcohol abuse Paternal Grandfather Tommy     Diabetes Paternal Grandfather Tommy     Cancer Maternal Grandfather Anthony     Arthritis Maternal Grandfather Anthony     Stroke Maternal Grandmother Mitzy     Atrial fibrillation Maternal Grandmother Mitzy

## 2025-08-01 LAB
C TRACH RRNA SPEC QL NAA+PROBE: NOT DETECTED
MIS SERPL-MCNC: NORMAL NG/ML
N GONORRHOEA RRNA SPEC QL NAA+PROBE: NOT DETECTED
QUEST GC CT AMPLIFIED (ALWAYS MESSAGE): NORMAL
T PALLIDUM AB SER QL IA: NORMAL

## 2025-08-02 LAB
AMPHET UR-MCNC: 1614 NG/ML
AMPHETAMINES UR QL: POSITIVE NG/ML
BARBITURATES UR QL: NEGATIVE NG/ML
BENZODIAZ UR QL: NEGATIVE NG/ML
BZE UR QL: NEGATIVE NG/ML
CREAT UR-MCNC: 20 MG/DL
DRUG SCREEN COMMENT UR-IMP: ABNORMAL
METHADONE UR QL: NEGATIVE NG/ML
METHAMPHET UR-MCNC: NEGATIVE NG/ML
OPIATES UR QL: NEGATIVE NG/ML
OXIDANTS UR QL: NEGATIVE MCG/ML
OXYCODONE UR QL: NEGATIVE NG/ML
PCP UR QL: NEGATIVE NG/ML
PH UR: 7.1 [PH] (ref 4.5–9)
QUEST NOTES AND COMMENTS: ABNORMAL
THC UR QL: NEGATIVE NG/ML

## 2025-08-04 ENCOUNTER — APPOINTMENT (OUTPATIENT)
Dept: INTEGRATIVE MEDICINE | Facility: CLINIC | Age: 35
End: 2025-08-04
Payer: COMMERCIAL

## 2025-08-04 DIAGNOSIS — M99.01 SEGMENTAL AND SOMATIC DYSFUNCTION OF CERVICAL REGION: Primary | ICD-10-CM

## 2025-08-04 DIAGNOSIS — M99.02 SEGMENTAL AND SOMATIC DYSFUNCTION OF THORACIC REGION: ICD-10-CM

## 2025-08-04 DIAGNOSIS — M79.10 MYALGIA: ICD-10-CM

## 2025-08-04 DIAGNOSIS — M99.03 SEGMENTAL AND SOMATIC DYSFUNCTION OF LUMBAR REGION: ICD-10-CM

## 2025-08-04 DIAGNOSIS — M54.2 CERVICALGIA: ICD-10-CM

## 2025-08-04 DIAGNOSIS — M79.9 POSTURAL STRAIN: ICD-10-CM

## 2025-08-04 DIAGNOSIS — M99.04 SEGMENTAL AND SOMATIC DYSFUNCTION OF SACRAL REGION: ICD-10-CM

## 2025-08-04 DIAGNOSIS — G44.86 CERVICOGENIC HEADACHE: ICD-10-CM

## 2025-08-04 PROCEDURE — 98941 CHIROPRACT MANJ 3-4 REGIONS: CPT | Performed by: CHIROPRACTOR

## 2025-08-04 PROCEDURE — 97112 NEUROMUSCULAR REEDUCATION: CPT | Performed by: CHIROPRACTOR

## 2025-08-04 NOTE — PROGRESS NOTES
Subjective   Patient ID: Yumi Richard is a 35 y.o. female who presents August 4, 2025 for neck pain, headaches and low back pain.    (14/20) VPCY    Today, the patient rates their degree of pain as a 2 out of 10 on the numeric pain rating scale.     Yumi returns for continued treatment of neck pain, headaches and low back pain. Patient states that she is doing well. She states that she had increased tension and headaches leading up to last Sunday, when she was taking an professional exam. She states that symptoms have resolved since then and she has mild discomfort today. Denies any other associated symptoms or change in medical history since last visit.   ____________________________________________________  Initial Exam:  Yumi presents for evaluation and treatment of neck pain, headaches and low back pain. Patient states that she has a long history of headaches. She states that she gets migraines, tension type headaches and sinus headaches. She states that migraines have recently been helped with topamax and have been reduced to twice a month. She states that tension type headaches are more frequent, 4-5 times a week. She states that neck pain starts at the base of the skull and goes down to the top of the shoulder, R>L. She states that screen time, looking down, and stress increase symptoms while ibuprofen, heat/ice, massage, stretching help reduce symptoms. She states that low back pain began about 2 months ago without incident. She states that pain goes across the low back and into the buttock, L>R. She states that she occasionally has a pinching sensation in her left glute while walking. She states that bending and lifting increase symptoms, while heat and rest help decrease symptoms. Patient denies any  difficulty speaking/swallowing, vision changes, bowel/bladder issues, chest pain/shortness of breath, numbness/tingling. Patient is new to chiropractic care.            Objective   Physical  Exam  Constitutional:       General: She is not in acute distress.     Appearance: Normal appearance.     HENT:      Head: Normocephalic and atraumatic.     Musculoskeletal:      Cervical back: Tenderness present. Pain with movement present. Decreased range of motion.      Thoracic back: Tenderness present.      Lumbar back: Tenderness present. Decreased range of motion.     Neurological:      General: No focal deficit present.      Mental Status: She is alert and oriented to person, place, and time.      Cranial Nerves: No dysarthria or facial asymmetry.      Sensory: Sensation is intact.      Motor: Motor function is intact.      Coordination: Coordination is intact.      Gait: Gait is intact.     Psychiatric:         Attention and Perception: Attention normal.         Mood and Affect: Mood normal.         Speech: Speech normal.         Behavior: Behavior is cooperative.         Palpation of the following regions revealed:  Cervical: Suboccipitals bilateral, hypertonicity and tenderness.  Upper trapezius bilateral, hypertonicity and tenderness.  Levator scapulae bilateral, hypertonicity and tenderness.  Cervical paraspinals bilateral, hypertonicity and tenderness.  Thoracic: Thoracic paraspinals bilateral, hypertonicity and tenderness.  Middle trapezius bilateral, hypertonicity and tenderness.  Pectoralis bilateral, hypertonicity and tenderness.  Lumbar: Lumbar paraspinals bilateral, hypertonicity and tenderness.  Gluteal bilateral, hypertonicity and tenderness.    Segmental Joint(s): Segmental joint dysfunction was assessed with motion palpation and is identified in the following areas:  Cervical : C0 C1 C5 C6 C7  Thoracic : T2., T3, T6, T7, and T10  Lumbopelvic / Sacral SIJ : L1 and L5/S1  Upper Extremities : R Glenohumeral joint and L Glenohumeral joint  Lower Extremities : R Hip and L Hip    Assessment/Plan   Today's Treatment Included: Spinal manipulation to the following regions of segmental dysfunction  identified on examination, using age-appropriate and injury specific force. Segmental Joint(s) Cervical : C0 C1 C5 C6 C7 Segmental Joint(s) Thoracic : T2., T3, T6, T7, and T10 Segmental Joint(s) Lumbopelvic/Sacral SIJ : L1 and L5/S1  Extremity manipulation performed to the following regions: Upper Extremities : R Glenohumeral joint and L Glenohumeral joint Lower Extremities : R Hip and L Hip  Chiropractic manipulation treatment techniques utilized: Diversified CMT, Cervical Manual Traction, and Long-Axis Traction   Soft tissue mobilization techniques utilized during treatment: Pin and Stretch, Cupping, and Ischemic compression  Neuromuscular Re-Education (69261): 1 Units; Start time: 3:45 pm  End time: 3:55 pm      Soft-tissue mobilization was performed in the following areas:  Suboccipital bilateral, Cervical paraspinal mm. bilateral, Upper Trapezius bilateral, and Levator Scap. bilateral  Thoracic Paraspinal mm. bilateral, Middle Trapezius bilateral, and Pectoralis bilateral  Lumbar Paraspinal mm. bilateral and Gluteal mm.  bilateral    Recommended follow-up in 3 week(s).     The patient tolerated today's treatment with little or no additional discomfort and was instructed to contact the office for questions or concerns.

## 2025-08-05 ENCOUNTER — APPOINTMENT (OUTPATIENT)
Dept: INTEGRATIVE MEDICINE | Facility: CLINIC | Age: 35
End: 2025-08-05
Payer: COMMERCIAL

## 2025-08-05 DIAGNOSIS — M54.59 MECHANICAL LOW BACK PAIN: Primary | ICD-10-CM

## 2025-08-05 DIAGNOSIS — M25.561 RIGHT MEDIAL KNEE PAIN: ICD-10-CM

## 2025-08-05 LAB
C TRACH RRNA SPEC QL NAA+PROBE: NOT DETECTED
MIS SERPL-MCNC: 0.71 NG/ML (ref 0.18–5.68)
N GONORRHOEA RRNA SPEC QL NAA+PROBE: NOT DETECTED
QUEST GC CT AMPLIFIED (ALWAYS MESSAGE): NORMAL
T PALLIDUM AB SER QL IA: NEGATIVE

## 2025-08-05 PROCEDURE — 97110 THERAPEUTIC EXERCISES: CPT | Performed by: CHIROPRACTOR

## 2025-08-05 NOTE — PROGRESS NOTES
Subjective     PATIENT ID: Yumi Richard is a 35 y.o. female who presents today August 5, 2025 for a new patient yoga therapy evaluation.    15/20     SUBJECTIVE/CHECK-IN:  Yumi returns for yoga therapy with main complaints of recurrent right knee pain, stress and anxiety. She found out this weekend that she passed the exam that she had been studying for in previous weeks. She is excited and feels a weight has been lifted. Notes that she now feels other tasks around the home and in life are left to be completed with the extra time. Her knee is feeling a bit achy today without inciting trauma/incident, but yesterday it was feeling good. She continues to feel her breath has been affected. She saw chiropractic yesterday with benefit. Overall, mood has been good and she is doing well.       -------------------------------------------------------------------------------  08/05/2025 SESSION:    - Awareness  + Breath:   - Supine bolster supported   - Breath awareness    - Breath expansion    - 360 breath    - Body scan - external    - Movement:   - Mat Work:     - LE self massage    - Ankle and foot mobility    - Hamstring pulses    - Rosa Pose --> R + L lateral    - Downward dog   - Series:    - Sun Saluation A    - Series B:     - Ogema --> Ogema Twist     - Denison 2 --> Rev W2 --> Ext Side Angle     - Block twist R and L    - Balance Series     - Fallen Dancer --> Flip Dog     - Standing splits --> Majorette --> Gentleman's --> Airplane   - Cool down:    - Supine figure 4    - [facility] wiper    - Knee to chest rocks    - Mindfulness + Relaxation   - Supine with bolster support   - Progressive relaxation with breath   - Positive affirmations      -----------------------------------------------------------------------------------  Session to Include:   - LE mobility   - LE stregthening   - Breathwork   - Guided imagery/mindfulness components     SESSION GOALS:  - Improve right knee pain and strengthen  right leg  - Stress/anxiety management  - Tools for depression    Time In: 9:00 a  Time Out: 9:55 a

## 2025-08-06 ENCOUNTER — APPOINTMENT (OUTPATIENT)
Dept: PRIMARY CARE | Facility: CLINIC | Age: 35
End: 2025-08-06
Payer: COMMERCIAL

## 2025-08-06 ENCOUNTER — TELEPHONE (OUTPATIENT)
Dept: PRIMARY CARE | Facility: CLINIC | Age: 35
End: 2025-08-06

## 2025-08-06 VITALS
SYSTOLIC BLOOD PRESSURE: 101 MMHG | OXYGEN SATURATION: 100 % | TEMPERATURE: 97.8 F | HEART RATE: 77 BPM | WEIGHT: 107.4 LBS | BODY MASS INDEX: 19.96 KG/M2 | DIASTOLIC BLOOD PRESSURE: 73 MMHG

## 2025-08-06 DIAGNOSIS — F90.0 ATTENTION DEFICIT HYPERACTIVITY DISORDER (ADHD), PREDOMINANTLY INATTENTIVE TYPE: Primary | ICD-10-CM

## 2025-08-06 PROCEDURE — 99212 OFFICE O/P EST SF 10 MIN: CPT

## 2025-08-06 ASSESSMENT — PATIENT HEALTH QUESTIONNAIRE - PHQ9
2. FEELING DOWN, DEPRESSED OR HOPELESS: SEVERAL DAYS
6. FEELING BAD ABOUT YOURSELF - OR THAT YOU ARE A FAILURE OR HAVE LET YOURSELF OR YOUR FAMILY DOWN: NOT AT ALL
7. TROUBLE CONCENTRATING ON THINGS, SUCH AS READING THE NEWSPAPER OR WATCHING TELEVISION: SEVERAL DAYS
5. POOR APPETITE OR OVEREATING: NOT AT ALL
SUM OF ALL RESPONSES TO PHQ QUESTIONS 1-9: 4
8. MOVING OR SPEAKING SO SLOWLY THAT OTHER PEOPLE COULD HAVE NOTICED. OR THE OPPOSITE, BEING SO FIGETY OR RESTLESS THAT YOU HAVE BEEN MOVING AROUND A LOT MORE THAN USUAL: NOT AT ALL
SUM OF ALL RESPONSES TO PHQ9 QUESTIONS 1 AND 2: 2
9. THOUGHTS THAT YOU WOULD BE BETTER OFF DEAD, OR OF HURTING YOURSELF: NOT AT ALL
3. TROUBLE FALLING OR STAYING ASLEEP OR SLEEPING TOO MUCH: SEVERAL DAYS
1. LITTLE INTEREST OR PLEASURE IN DOING THINGS: SEVERAL DAYS
4. FEELING TIRED OR HAVING LITTLE ENERGY: NOT AT ALL

## 2025-08-06 ASSESSMENT — PAIN SCALES - GENERAL: PAINLEVEL_OUTOF10: 6

## 2025-08-12 ENCOUNTER — APPOINTMENT (OUTPATIENT)
Dept: INTEGRATIVE MEDICINE | Facility: CLINIC | Age: 35
End: 2025-08-12
Payer: COMMERCIAL

## 2025-08-12 DIAGNOSIS — M54.59 MECHANICAL LOW BACK PAIN: Primary | ICD-10-CM

## 2025-08-12 DIAGNOSIS — M25.561 RIGHT MEDIAL KNEE PAIN: ICD-10-CM

## 2025-08-12 PROCEDURE — 97110 THERAPEUTIC EXERCISES: CPT | Performed by: CHIROPRACTOR

## 2025-08-15 ENCOUNTER — APPOINTMENT (OUTPATIENT)
Dept: OBSTETRICS AND GYNECOLOGY | Facility: CLINIC | Age: 35
End: 2025-08-15
Payer: COMMERCIAL

## 2025-08-15 ENCOUNTER — ANCILLARY PROCEDURE (OUTPATIENT)
Dept: ENDOCRINOLOGY | Facility: CLINIC | Age: 35
End: 2025-08-15
Payer: COMMERCIAL

## 2025-08-15 VITALS
SYSTOLIC BLOOD PRESSURE: 114 MMHG | HEIGHT: 62 IN | BODY MASS INDEX: 19.14 KG/M2 | DIASTOLIC BLOOD PRESSURE: 80 MMHG | WEIGHT: 104 LBS

## 2025-08-15 DIAGNOSIS — Z31.83 ENCOUNTER FOR ASSISTED REPRODUCTIVE FERTILITY CYCLE: ICD-10-CM

## 2025-08-15 DIAGNOSIS — Z12.4 SCREENING FOR MALIGNANT NEOPLASM OF CERVIX: ICD-10-CM

## 2025-08-15 DIAGNOSIS — Z31.41 FERTILITY TESTING: ICD-10-CM

## 2025-08-15 DIAGNOSIS — Z01.419 WELL WOMAN EXAM WITH ROUTINE GYNECOLOGICAL EXAM: Primary | ICD-10-CM

## 2025-08-15 PROCEDURE — 1036F TOBACCO NON-USER: CPT | Performed by: OBSTETRICS & GYNECOLOGY

## 2025-08-15 PROCEDURE — 76830 TRANSVAGINAL US NON-OB: CPT | Performed by: STUDENT IN AN ORGANIZED HEALTH CARE EDUCATION/TRAINING PROGRAM

## 2025-08-15 PROCEDURE — 76830 TRANSVAGINAL US NON-OB: CPT

## 2025-08-15 PROCEDURE — 99385 PREV VISIT NEW AGE 18-39: CPT | Performed by: OBSTETRICS & GYNECOLOGY

## 2025-08-15 PROCEDURE — 3008F BODY MASS INDEX DOCD: CPT | Performed by: OBSTETRICS & GYNECOLOGY

## 2025-08-20 ENCOUNTER — APPOINTMENT (OUTPATIENT)
Dept: INTEGRATIVE MEDICINE | Facility: CLINIC | Age: 35
End: 2025-08-20
Payer: COMMERCIAL

## 2025-08-20 DIAGNOSIS — M99.03 SEGMENTAL AND SOMATIC DYSFUNCTION OF LUMBAR REGION: ICD-10-CM

## 2025-08-20 DIAGNOSIS — M99.04 SEGMENTAL AND SOMATIC DYSFUNCTION OF SACRAL REGION: ICD-10-CM

## 2025-08-20 DIAGNOSIS — G44.86 CERVICOGENIC HEADACHE: ICD-10-CM

## 2025-08-20 DIAGNOSIS — M54.2 CERVICALGIA: ICD-10-CM

## 2025-08-20 DIAGNOSIS — M79.10 MYALGIA: ICD-10-CM

## 2025-08-20 DIAGNOSIS — M99.01 SEGMENTAL AND SOMATIC DYSFUNCTION OF CERVICAL REGION: Primary | ICD-10-CM

## 2025-08-20 DIAGNOSIS — M99.02 SEGMENTAL AND SOMATIC DYSFUNCTION OF THORACIC REGION: ICD-10-CM

## 2025-08-20 DIAGNOSIS — M79.9 POSTURAL STRAIN: ICD-10-CM

## 2025-08-20 PROCEDURE — 98941 CHIROPRACT MANJ 3-4 REGIONS: CPT | Performed by: CHIROPRACTOR

## 2025-08-20 PROCEDURE — 97112 NEUROMUSCULAR REEDUCATION: CPT | Performed by: CHIROPRACTOR

## 2025-08-20 PROCEDURE — MASS60E MASSAGE 60 MINUTES EMPLOYEE RATE

## 2025-09-02 ENCOUNTER — TELEMEDICINE (OUTPATIENT)
Dept: ENDOCRINOLOGY | Facility: CLINIC | Age: 35
End: 2025-09-02
Payer: COMMERCIAL

## 2025-09-02 VITALS — HEIGHT: 62 IN | WEIGHT: 101 LBS | BODY MASS INDEX: 18.58 KG/M2

## 2025-09-02 DIAGNOSIS — Z31.62 ENCOUNTER FOR FERTILITY PRESERVATION COUNSELING: Primary | ICD-10-CM

## 2025-09-02 PROCEDURE — 1036F TOBACCO NON-USER: CPT | Performed by: OBSTETRICS & GYNECOLOGY

## 2025-09-02 PROCEDURE — 99214 OFFICE O/P EST MOD 30 MIN: CPT | Performed by: OBSTETRICS & GYNECOLOGY

## 2025-09-02 PROCEDURE — 3008F BODY MASS INDEX DOCD: CPT | Performed by: OBSTETRICS & GYNECOLOGY

## 2025-09-02 ASSESSMENT — PATIENT HEALTH QUESTIONNAIRE - PHQ9
2. FEELING DOWN, DEPRESSED OR HOPELESS: NOT AT ALL
SUM OF ALL RESPONSES TO PHQ9 QUESTIONS 1 AND 2: 0
1. LITTLE INTEREST OR PLEASURE IN DOING THINGS: NOT AT ALL

## 2025-09-02 ASSESSMENT — PAIN SCALES - GENERAL: PAINLEVEL_OUTOF10: 0-NO PAIN

## 2025-09-03 LAB
CYTOLOGY CMNT CVX/VAG CYTO-IMP: NORMAL
HPV HR 12 DNA GENITAL QL NAA+PROBE: NEGATIVE
HPV HR GENOTYPES PNL CVX NAA+PROBE: NEGATIVE
HPV16 DNA SPEC QL NAA+PROBE: NEGATIVE
HPV18 DNA SPEC QL NAA+PROBE: NEGATIVE
LAB AP HPV GENOTYPE QUESTION: YES
LAB AP HPV HR: NORMAL
LAB AP TREATMENT HISTORY: NORMAL
LABORATORY COMMENT REPORT: NORMAL
LMP START DATE: NORMAL
PATH REPORT.TOTAL CANCER: NORMAL

## 2025-11-06 ENCOUNTER — APPOINTMENT (OUTPATIENT)
Dept: PRIMARY CARE | Facility: CLINIC | Age: 35
End: 2025-11-06
Payer: COMMERCIAL

## 2025-11-06 ENCOUNTER — APPOINTMENT (OUTPATIENT)
Dept: ENDOCRINOLOGY | Facility: CLINIC | Age: 35
End: 2025-11-06
Payer: COMMERCIAL

## 2026-03-26 ENCOUNTER — APPOINTMENT (OUTPATIENT)
Dept: PRIMARY CARE | Facility: CLINIC | Age: 36
End: 2026-03-26
Payer: COMMERCIAL